# Patient Record
Sex: FEMALE | Race: WHITE | NOT HISPANIC OR LATINO | Employment: FULL TIME | URBAN - METROPOLITAN AREA
[De-identification: names, ages, dates, MRNs, and addresses within clinical notes are randomized per-mention and may not be internally consistent; named-entity substitution may affect disease eponyms.]

---

## 2023-02-24 ENCOUNTER — TELEPHONE (OUTPATIENT)
Dept: INTERNAL MEDICINE | Facility: CLINIC | Age: 33
End: 2023-02-24

## 2023-02-24 ENCOUNTER — OFFICE VISIT (OUTPATIENT)
Dept: INTERNAL MEDICINE | Facility: CLINIC | Age: 33
End: 2023-02-24
Payer: OTHER GOVERNMENT

## 2023-02-24 VITALS
WEIGHT: 156.1 LBS | OXYGEN SATURATION: 98 % | SYSTOLIC BLOOD PRESSURE: 110 MMHG | TEMPERATURE: 97.6 F | DIASTOLIC BLOOD PRESSURE: 70 MMHG | HEART RATE: 72 BPM | HEIGHT: 67 IN | BODY MASS INDEX: 24.5 KG/M2

## 2023-02-24 DIAGNOSIS — F41.0 GENERALIZED ANXIETY DISORDER WITH PANIC ATTACKS: ICD-10-CM

## 2023-02-24 DIAGNOSIS — G89.29 CHRONIC BILATERAL LOW BACK PAIN, UNSPECIFIED WHETHER SCIATICA PRESENT: ICD-10-CM

## 2023-02-24 DIAGNOSIS — Z00.00 HEALTHCARE MAINTENANCE: Primary | ICD-10-CM

## 2023-02-24 DIAGNOSIS — F41.1 GENERALIZED ANXIETY DISORDER WITH PANIC ATTACKS: ICD-10-CM

## 2023-02-24 DIAGNOSIS — J45.20 MILD INTERMITTENT ASTHMA WITHOUT COMPLICATION: ICD-10-CM

## 2023-02-24 DIAGNOSIS — Z01.419 ENCOUNTER FOR WELL WOMAN EXAM WITH ROUTINE GYNECOLOGICAL EXAM: ICD-10-CM

## 2023-02-24 DIAGNOSIS — B35.4 TINEA CORPORIS: ICD-10-CM

## 2023-02-24 DIAGNOSIS — F32.A DEPRESSION, UNSPECIFIED DEPRESSION TYPE: ICD-10-CM

## 2023-02-24 DIAGNOSIS — Z91.09 ENVIRONMENTAL ALLERGIES: ICD-10-CM

## 2023-02-24 DIAGNOSIS — F90.0 ATTENTION DEFICIT HYPERACTIVITY DISORDER (ADHD), PREDOMINANTLY INATTENTIVE TYPE: ICD-10-CM

## 2023-02-24 DIAGNOSIS — M54.50 CHRONIC BILATERAL LOW BACK PAIN, UNSPECIFIED WHETHER SCIATICA PRESENT: ICD-10-CM

## 2023-02-24 PROCEDURE — 99214 OFFICE O/P EST MOD 30 MIN: CPT | Performed by: NURSE PRACTITIONER

## 2023-02-24 PROCEDURE — 99385 PREV VISIT NEW AGE 18-39: CPT | Performed by: NURSE PRACTITIONER

## 2023-02-24 RX ORDER — POLYETHYLENE GLYCOL 3350 17 G/17G
17 POWDER, FOR SOLUTION ORAL DAILY
Qty: 850 G | Refills: 1 | Status: SHIPPED | OUTPATIENT
Start: 2023-02-24 | End: 2023-02-24 | Stop reason: SDUPTHER

## 2023-02-24 RX ORDER — BUPROPION HYDROCHLORIDE 150 MG/1
150 TABLET ORAL DAILY
Qty: 30 TABLET | Refills: 5 | Status: SHIPPED | OUTPATIENT
Start: 2023-02-24 | End: 2023-03-22

## 2023-02-24 RX ORDER — LIDOCAINE 50 MG/G
1 PATCH TOPICAL EVERY 24 HOURS
COMMUNITY
End: 2023-02-24 | Stop reason: SDUPTHER

## 2023-02-24 RX ORDER — LORATADINE 10 MG/1
CAPSULE, LIQUID FILLED ORAL
COMMUNITY
End: 2023-02-24 | Stop reason: SDUPTHER

## 2023-02-24 RX ORDER — POLYETHYLENE GLYCOL 3350 17 G/17G
17 POWDER, FOR SOLUTION ORAL DAILY
COMMUNITY
End: 2023-02-24 | Stop reason: SDUPTHER

## 2023-02-24 RX ORDER — METHYLPREDNISOLONE 4 MG/1
TABLET ORAL
COMMUNITY
Start: 2022-11-17 | End: 2023-02-24

## 2023-02-24 RX ORDER — LORATADINE 10 MG/1
10 TABLET ORAL DAILY
Qty: 90 TABLET | Refills: 1 | Status: SHIPPED | OUTPATIENT
Start: 2023-02-24 | End: 2023-03-22

## 2023-02-24 RX ORDER — CLOTRIMAZOLE 1 %
1 CREAM (GRAM) TOPICAL 2 TIMES DAILY
Qty: 40 G | Refills: 0 | Status: SHIPPED | OUTPATIENT
Start: 2023-02-24 | End: 2023-03-22

## 2023-02-24 RX ORDER — ALPRAZOLAM 0.25 MG/1
0.25 TABLET ORAL 2 TIMES DAILY PRN
Qty: 30 TABLET | Refills: 0 | Status: CANCELLED | OUTPATIENT
Start: 2023-02-24

## 2023-02-24 RX ORDER — LEVONORGESTREL / ETHINYL ESTRADIOL 0.15-0.03
1 KIT ORAL DAILY
Qty: 90 TABLET | Refills: 1 | Status: SHIPPED | OUTPATIENT
Start: 2023-02-24

## 2023-02-24 RX ORDER — MONTELUKAST SODIUM 10 MG/1
10 TABLET ORAL NIGHTLY
Qty: 30 TABLET | Refills: 5 | Status: SHIPPED | OUTPATIENT
Start: 2023-02-24

## 2023-02-24 RX ORDER — ALPRAZOLAM 0.25 MG/1
0.25 TABLET ORAL 2 TIMES DAILY PRN
Qty: 30 TABLET | Refills: 0 | Status: SHIPPED | OUTPATIENT
Start: 2023-02-24

## 2023-02-24 RX ORDER — LEVONORGESTREL / ETHINYL ESTRADIOL 0.15-0.03
KIT ORAL
COMMUNITY
Start: 2022-11-23 | End: 2023-02-24 | Stop reason: SDUPTHER

## 2023-02-24 RX ORDER — LIDOCAINE 50 MG/G
1 PATCH TOPICAL EVERY 24 HOURS
Qty: 30 EACH | Refills: 5 | Status: SHIPPED | OUTPATIENT
Start: 2023-02-24

## 2023-02-24 RX ORDER — ALPRAZOLAM 0.5 MG/1
0.25 TABLET ORAL 2 TIMES DAILY PRN
COMMUNITY
End: 2023-02-24 | Stop reason: DRUGHIGH

## 2023-02-24 RX ORDER — LORATADINE 10 MG/1
10 TABLET ORAL DAILY
COMMUNITY
End: 2023-02-24 | Stop reason: SDUPTHER

## 2023-02-24 RX ORDER — POLYETHYLENE GLYCOL 3350 17 G/17G
17 POWDER, FOR SOLUTION ORAL DAILY
Qty: 850 G | Refills: 1 | Status: SHIPPED | OUTPATIENT
Start: 2023-02-24

## 2023-02-24 NOTE — PROGRESS NOTES
Subjective   Giovanna Lao is a 32 y.o. female.     Chief Complaint   Patient presents with   • Contraception   • Establish Care        History of Present Illness   She is here today as a new patient to establish care.  She feels like her overall health is okay.  She tries to exercise regularly and eat a healthy, balanced diet.  She has noticed return of a fungal skin infection on her chest and stomach under the breast.  She has previously had this in the past requiring antifungal treatment.  [place]     Anxiety with panic attacks- she currently uses Xanax 0.25 mg twice daily as needed for panic attacks. She uses this rarely, on average once every 3 months with improvement in anxiety.   Depression- she notes some feelings of anhedonia and decreased motivation over the past year. Symptoms are worse in the morning. She was previously seeing a therapist, but did not feel this was beneficial.   ADHD- dx in . She was previously on Adderrall, but she did not like the way she felt on this.     GERD/gastritis- she had improvement in symptoms when becoming pregnant with her daughter.  She denies any symptoms currently.  She denies any dysphagia odynophagia  IBS-C- she was previously taking Miralax regularly, but symptoms improved after having her daughter.     Chronic Low Back Pain- hx of slimmed discs. She has previously had MRI imaging completed in  in [place]. She has completed PT with mild improvement. She currently uses lidocaine patches and ibuprofen with management of pain.    Asthma/environmental allergies-she uses albuterol rarely.  She is currently taking fluticasone and Claritin daily but is still having breakthrough allergy symptoms.    GYN- . C/S. She is currently on OCP, Jolessa continuous for 90 days.  She is needing a refill today. Menses are heavy, lasting 4 days. She has dysmenorrhea with this. She previously had an IUD placed and did well with this.  LMP 12/17/2022. Last pap completed 02/2021, normal. No hx of abnormals. She is currently sexually active, monogamous with her .    She is  with a daughter Ninoska 16 months.    The following portions of the patient's history were reviewed and updated as appropriate: allergies, current medications, past family history, past medical history, past social history, past surgical history and problem list.    Review of Systems   Constitutional: Negative for appetite change, chills, diaphoresis, fatigue, fever, unexpected weight gain and unexpected weight loss.   HENT: Positive for congestion, postnasal drip and sore throat. Negative for dental problem, ear pain, hearing loss, mouth sores, nosebleeds, rhinorrhea, sinus pressure, swollen glands, tinnitus and trouble swallowing.    Eyes: Negative for blurred vision, pain, discharge, redness, itching and visual disturbance.   Respiratory: Negative for cough, chest tightness, shortness of breath and wheezing.    Cardiovascular: Negative for chest pain, palpitations and leg swelling.   Gastrointestinal: Negative for abdominal distention, abdominal pain, blood in stool, constipation, diarrhea, nausea, vomiting and GERD.   Endocrine: Negative for cold intolerance and heat intolerance.   Genitourinary: Positive for menstrual problem. Negative for breast discharge, breast lump, breast pain, difficulty urinating, dyspareunia, dysuria, flank pain, frequency, hematuria, pelvic pain, pelvic pressure, urgency, urinary incontinence, vaginal bleeding and vaginal discharge.   Musculoskeletal: Positive for arthralgias (with cold weather) and back pain. Negative for gait problem, joint swelling, myalgias and neck pain.   Skin: Positive for rash. Negative for color change and skin lesions.   Allergic/Immunologic: Positive for environmental allergies. Negative for food allergies.   Neurological: Negative for dizziness, tremors, seizures, syncope, speech difficulty, weakness,  light-headedness, numbness, headache, memory problem and confusion.   Hematological: Negative for adenopathy. Does not bruise/bleed easily.   Psychiatric/Behavioral: Positive for depressed mood. Negative for sleep disturbance, suicidal ideas and stress. The patient is nervous/anxious.        Objective   Physical Exam  Constitutional:       Appearance: Normal appearance. She is well-developed.   HENT:      Head: Normocephalic and atraumatic.      Right Ear: Hearing, tympanic membrane, ear canal and external ear normal.      Left Ear: Hearing, tympanic membrane, ear canal and external ear normal.      Nose: Rhinorrhea present. Rhinorrhea is clear.      Right Turbinates: Enlarged.      Left Turbinates: Enlarged.      Right Sinus: No maxillary sinus tenderness or frontal sinus tenderness.      Left Sinus: No maxillary sinus tenderness or frontal sinus tenderness.      Mouth/Throat:      Lips: Pink.      Mouth: Mucous membranes are moist.      Dentition: Normal dentition.      Tongue: No lesions.      Pharynx: Uvula midline. No pharyngeal swelling, oropharyngeal exudate, posterior oropharyngeal erythema or uvula swelling.      Tonsils: No tonsillar exudate.      Comments: Steer pharynx with clear drainage.  Eyes:      General: Lids are normal.      Extraocular Movements: Extraocular movements intact.      Conjunctiva/sclera: Conjunctivae normal.      Pupils: Pupils are equal, round, and reactive to light.   Neck:      Thyroid: No thyroid mass, thyromegaly or thyroid tenderness.      Vascular: No carotid bruit.      Trachea: Trachea normal.   Cardiovascular:      Rate and Rhythm: Normal rate and regular rhythm.      Pulses: Normal pulses.           Radial pulses are 2+ on the right side and 2+ on the left side.        Popliteal pulses are 2+ on the right side and 2+ on the left side.        Dorsalis pedis pulses are 2+ on the right side and 2+ on the left side.        Posterior tibial pulses are 2+ on the right side and  2+ on the left side.      Heart sounds: S1 normal and S2 normal.   Pulmonary:      Effort: Pulmonary effort is normal.      Breath sounds: Normal breath sounds.   Abdominal:      General: Bowel sounds are normal. There is no distension or abdominal bruit.      Palpations: Abdomen is soft. There is no hepatomegaly or splenomegaly.      Tenderness: There is no abdominal tenderness.      Hernia: No hernia is present.   Musculoskeletal:      Cervical back: Normal range of motion and neck supple.      Right lower leg: No edema.      Left lower leg: No edema.   Lymphadenopathy:      Head:      Right side of head: No submental, submandibular, tonsillar or occipital adenopathy.      Left side of head: No submental, submandibular, tonsillar or occipital adenopathy.      Cervical: No cervical adenopathy.      Upper Body:      Right upper body: No supraclavicular adenopathy.      Left upper body: No supraclavicular adenopathy.      Lower Body: No right inguinal adenopathy. No left inguinal adenopathy.   Skin:     General: Skin is warm and dry.      Findings: Rash present.      Nails: There is no clubbing.             Comments: Annular rash with central clearing present on chest under breasts.   Neurological:      General: No focal deficit present.      Mental Status: She is alert and oriented to person, place, and time.      Cranial Nerves: Cranial nerves 2-12 are intact.      Sensory: Sensation is intact.      Motor: Motor function is intact.      Coordination: Coordination is intact.      Gait: Gait is intact.      Deep Tendon Reflexes:      Reflex Scores:       Patellar reflexes are 2+ on the right side and 2+ on the left side.  Psychiatric:         Attention and Perception: Attention normal.         Mood and Affect: Mood and affect normal.         Speech: Speech normal.         Behavior: Behavior normal.         Thought Content: Thought content normal.         Cognition and Memory: Cognition normal.         Vitals:     02/24/23 1428   BP: 110/70   Pulse: 72   Temp: 97.6 °F (36.4 °C)   SpO2: 98%          Assessment & Plan   Diagnoses and all orders for this visit:    1. Healthcare maintenance (Primary)  -     CBC & Differential; Future  -     Comprehensive Metabolic Panel; Future  -     Hepatitis C Antibody; Future  -     Lipid Panel With LDL / HDL Ratio; Future  -     TSH Rfx On Abnormal To Free T4; Future    2. Generalized anxiety disorder with panic attacks  -     ALPRAZolam (XANAX) 0.25 MG tablet; Take 1 tablet by mouth 2 (Two) Times a Day As Needed for Anxiety.  Dispense: 30 tablet; Refill: 0  -     CBC & Differential; Future  -     Comprehensive Metabolic Panel; Future  -     Hepatitis C Antibody; Future  -     Lipid Panel With LDL / HDL Ratio; Future  -     TSH Rfx On Abnormal To Free T4; Future    3. Depression, unspecified depression type  -     buPROPion XL (Wellbutrin XL) 150 MG 24 hr tablet; Take 1 tablet by mouth Daily.  Dispense: 30 tablet; Refill: 5  -     CBC & Differential; Future  -     Comprehensive Metabolic Panel; Future  -     Hepatitis C Antibody; Future  -     Lipid Panel With LDL / HDL Ratio; Future  -     TSH Rfx On Abnormal To Free T4; Future    4. Attention deficit hyperactivity disorder (ADHD), predominantly inattentive type  -     buPROPion XL (Wellbutrin XL) 150 MG 24 hr tablet; Take 1 tablet by mouth Daily.  Dispense: 30 tablet; Refill: 5  -     CBC & Differential; Future  -     Comprehensive Metabolic Panel; Future  -     Hepatitis C Antibody; Future  -     Lipid Panel With LDL / HDL Ratio; Future  -     TSH Rfx On Abnormal To Free T4; Future    5. Environmental allergies  -     Discontinue: fluticasone (VERAMYST) 27.5 MCG/SPRAY nasal spray; 2 sprays into the nostril(s) as directed by provider Daily.  Dispense: 9.1 mL; Refill: 5  -     loratadine (CLARITIN) 10 MG tablet; Take 1 tablet by mouth Daily.  Dispense: 90 tablet; Refill: 1  -     montelukast (Singulair) 10 MG tablet; Take 1 tablet by mouth  Every Night.  Dispense: 30 tablet; Refill: 5  -     fluticasone (VERAMYST) 27.5 MCG/SPRAY nasal spray; 2 sprays into the nostril(s) as directed by provider Daily.  Dispense: 9.1 mL; Refill: 5  -     CBC & Differential; Future  -     Comprehensive Metabolic Panel; Future  -     Hepatitis C Antibody; Future  -     Lipid Panel With LDL / HDL Ratio; Future  -     TSH Rfx On Abnormal To Free T4; Future    6. Mild intermittent asthma without complication  -     Discontinue: fluticasone (VERAMYST) 27.5 MCG/SPRAY nasal spray; 2 sprays into the nostril(s) as directed by provider Daily.  Dispense: 9.1 mL; Refill: 5  -     montelukast (Singulair) 10 MG tablet; Take 1 tablet by mouth Every Night.  Dispense: 30 tablet; Refill: 5  -     fluticasone (VERAMYST) 27.5 MCG/SPRAY nasal spray; 2 sprays into the nostril(s) as directed by provider Daily.  Dispense: 9.1 mL; Refill: 5  -     CBC & Differential; Future  -     Comprehensive Metabolic Panel; Future  -     Hepatitis C Antibody; Future  -     Lipid Panel With LDL / HDL Ratio; Future  -     TSH Rfx On Abnormal To Free T4; Future    7. Chronic bilateral low back pain, unspecified whether sciatica present  -     lidocaine (LIDODERM) 5 %; Place 1 patch on the skin as directed by provider Daily. Remove & Discard patch within 12 hours or as directed by MD  Dispense: 30 each; Refill: 5  -     CBC & Differential; Future  -     Comprehensive Metabolic Panel; Future  -     Hepatitis C Antibody; Future  -     Lipid Panel With LDL / HDL Ratio; Future  -     TSH Rfx On Abnormal To Free T4; Future    8. Tinea corporis  -     clotrimazole (LOTRIMIN) 1 % cream; Apply 1 application topically to the appropriate area as directed 2 (Two) Times a Day.  Dispense: 40 g; Refill: 0    9. Encounter for well woman exam with routine gynecological exam  -     Ambulatory Referral to Gynecology    Other orders  -     Jolessa 0.15-0.03 MG per tablet; Take 1 tablet by mouth Daily.  Dispense: 90 tablet; Refill:  1  -     Discontinue: polyethylene glycol (MiraLax) 17 GM/SCOOP powder; Take 17 g by mouth Daily.  Dispense: 850 g; Refill: 1  -     Discontinue: polyethylene glycol (MiraLax) 17 GM/SCOOP powder; Take 17 g by mouth Daily.  Dispense: 850 g; Refill: 1  -     polyethylene glycol (MiraLax) 17 GM/SCOOP powder; Take 17 g by mouth Daily.  Dispense: 850 g; Refill: 1      1.  Preventative counseling-encouraged healthy, balanced diet and regular exercise.  Ensure adequate dietary intake of calcium and vitamin D.  2.  Generalized anxiety with panic attacks-stable with as needed Xanax 0.25 mg.  Discussed appropriate use and adverse effects of this high risk medication.  GEOVANNA and Damián completed today.  Prescription refilled today.  Follow-up in 4 weeks.  3.  Depression/ADHD-we will try Wellbutrin  mg daily.  Discussed appropriate use and adverse effects.  No personal history of seizures.  Follow-up in 4 weeks for medication check.  4.  Asthma/environmental allergies-not controlled.  We will try addition of montelukast 10 mg at bedtime.  Do not start this new medication at the same time as Wellbutrin.  Discussed appropriate use and adverse effects.  Notify for any mood changes.  Follow-up in 4 weeks for medication check.  5.  Chronic bilateral low back pain- lidocaine patches refilled today.  MRI records requested from previous PCP.  Recommend regular exercise.  Notify for worsening symptoms.  6.  Tinea corporis-start clotrimazole cream twice a day for 7 to 10 days.     GYN-due.  OCP refilled today.  Referral placed to gynecology to establish care.  Eye exam up-to-date  Dentist up-to-date  Encourage sunscreen use outside    Fasting labs in 2 weeks.  Follow-up in 4 weeks for medication check.

## 2023-03-03 ENCOUNTER — OFFICE VISIT (OUTPATIENT)
Dept: INTERNAL MEDICINE | Facility: CLINIC | Age: 33
End: 2023-03-03
Payer: OTHER GOVERNMENT

## 2023-03-03 VITALS
WEIGHT: 156 LBS | HEIGHT: 67 IN | BODY MASS INDEX: 24.48 KG/M2 | DIASTOLIC BLOOD PRESSURE: 62 MMHG | HEART RATE: 85 BPM | OXYGEN SATURATION: 100 % | SYSTOLIC BLOOD PRESSURE: 98 MMHG | TEMPERATURE: 97.9 F

## 2023-03-03 DIAGNOSIS — J20.9 ACUTE BRONCHITIS, UNSPECIFIED ORGANISM: Primary | ICD-10-CM

## 2023-03-03 LAB
EXPIRATION DATE: NORMAL
FLUAV AG UPPER RESP QL IA.RAPID: NOT DETECTED
FLUBV AG UPPER RESP QL IA.RAPID: NOT DETECTED
INTERNAL CONTROL: NORMAL
Lab: NORMAL
SARS-COV-2 AG UPPER RESP QL IA.RAPID: NOT DETECTED

## 2023-03-03 PROCEDURE — 87428 SARSCOV & INF VIR A&B AG IA: CPT | Performed by: NURSE PRACTITIONER

## 2023-03-03 PROCEDURE — 99213 OFFICE O/P EST LOW 20 MIN: CPT | Performed by: NURSE PRACTITIONER

## 2023-03-03 RX ORDER — BENZONATATE 200 MG/1
200 CAPSULE ORAL 3 TIMES DAILY PRN
Qty: 21 CAPSULE | Refills: 0 | Status: SHIPPED | OUTPATIENT
Start: 2023-03-03 | End: 2023-03-22

## 2023-03-03 RX ORDER — METHYLPREDNISOLONE 4 MG/1
TABLET ORAL
Qty: 21 EACH | Refills: 0 | Status: SHIPPED | OUTPATIENT
Start: 2023-03-03 | End: 2023-03-22

## 2023-03-03 RX ORDER — AZITHROMYCIN 250 MG/1
TABLET, FILM COATED ORAL
Qty: 6 TABLET | Refills: 0 | Status: SHIPPED | OUTPATIENT
Start: 2023-03-03 | End: 2023-03-22

## 2023-03-03 NOTE — PROGRESS NOTES
Subjective   Giovanna Lao is a 32 y.o. female.     Chief Complaint   Patient presents with   • URI     Bilateral ear fluid, wheezing, cough (wet and productive w/yellow phlegm) x6 days. Taking Zyrtec D for relief.         History of Present Illness   She is here today with complaints of cough and congestion.  Symptoms started 6 days ago.  She is experiencing productive cough of yellow, thick sputum. Cough is worse at night. She notes head congestion and bilateral aural fullness. She notes mild sinus pressure.   She denies any fever, chills, SOB, wheezing.  Has been using Zyrtec-D without improvement, and albuterol.   Her daughter has recently been sick with similar symptoms.  Symptoms are staying the same.     The following portions of the patient's history were reviewed and updated as appropriate: allergies, current medications, past family history, past medical history, past social history, past surgical history and problem list.    Review of Systems   Constitutional: Positive for fatigue. Negative for chills and fever.   HENT: Positive for congestion, postnasal drip and sinus pressure. Negative for ear pain (bilateral aural fullness), rhinorrhea, sore throat and trouble swallowing.    Respiratory: Positive for cough. Negative for chest tightness, shortness of breath and wheezing.    Cardiovascular: Negative for chest pain, palpitations and leg swelling.   Neurological: Positive for headache.       Objective   Physical Exam  Constitutional:       General: She is awake.      Appearance: She is well-developed. She is ill-appearing.   HENT:      Head: Normocephalic and atraumatic.      Right Ear: Hearing, ear canal and external ear normal.      Left Ear: Hearing, ear canal and external ear normal.      Ears:      Comments: Fluid present bilateral TM.     Nose: Rhinorrhea present. Rhinorrhea is clear.      Right Turbinates: Enlarged.      Left Turbinates: Enlarged.      Right Sinus: No maxillary sinus tenderness or  frontal sinus tenderness.      Left Sinus: No maxillary sinus tenderness or frontal sinus tenderness.      Mouth/Throat:      Lips: Pink.      Mouth: Mucous membranes are moist. No injury or oral lesions.      Dentition: Normal dentition.      Tongue: No lesions. Tongue does not deviate from midline.      Palate: No mass and lesions.      Pharynx: Uvula midline. No pharyngeal swelling, oropharyngeal exudate, posterior oropharyngeal erythema or uvula swelling.      Comments: Posterior pharynx with clear drainage.  Neck:      Thyroid: No thyroid mass, thyromegaly or thyroid tenderness.      Vascular: No carotid bruit.      Trachea: Trachea normal.   Cardiovascular:      Rate and Rhythm: Normal rate and regular rhythm.      Chest Wall: PMI is not displaced.      Pulses:           Radial pulses are 2+ on the right side and 2+ on the left side.        Dorsalis pedis pulses are 2+ on the right side and 2+ on the left side.        Posterior tibial pulses are 2+ on the right side and 2+ on the left side.      Heart sounds: S1 normal and S2 normal.   Pulmonary:      Effort: Pulmonary effort is normal.      Breath sounds: Examination of the right-upper field reveals wheezing. Examination of the left-upper field reveals wheezing. Wheezing present. No decreased breath sounds, rhonchi or rales.   Musculoskeletal:      Right lower leg: No edema.      Left lower leg: No edema.   Lymphadenopathy:      Head:      Right side of head: No submental, submandibular, tonsillar or occipital adenopathy.      Left side of head: No submental, submandibular, tonsillar or occipital adenopathy.      Cervical: No cervical adenopathy.   Skin:     General: Skin is warm and dry.      Capillary Refill: Capillary refill takes less than 2 seconds.      Nails: There is no clubbing.   Neurological:      Mental Status: She is alert and oriented to person, place, and time.   Psychiatric:         Attention and Perception: Attention normal.         Mood and  Affect: Mood and affect normal.         Speech: Speech normal.         Behavior: Behavior normal. Behavior is cooperative.         Thought Content: Thought content normal.         Cognition and Memory: Cognition normal.         Vitals:    03/03/23 0907   BP: 98/62   Pulse: 85   Temp: 97.9 °F (36.6 °C)   SpO2: 100%      Body mass index is 24.43 kg/m².    Assessment & Plan   Diagnoses and all orders for this visit:    1. Acute bronchitis, unspecified organism (Primary)  -     POCT SARS-CoV-2 Antigen TREE + Flu  -     methylPREDNISolone (MEDROL) 4 MG dose pack; Take as directed on package instructions.  Dispense: 21 each; Refill: 0  -     benzonatate (TESSALON) 200 MG capsule; Take 1 capsule by mouth 3 (Three) Times a Day As Needed for Cough.  Dispense: 21 capsule; Refill: 0    Other orders  -     azithromycin (Zithromax Z-Arvind) 250 MG tablet; Take 2 tablets by mouth on day 1, then 1 tablet daily on days 2-5  Dispense: 6 tablet; Refill: 0      1.  Acute bronchitis-rapid COVID and flu test negative today in office.  With her symptoms becoming worse now deeper in her chest send a prescription for Medrol Dosepak along with a Z-Arvind to cover for bacterial pathogens.  Recommend using albuterol 2 puffs every 4-6 hours as needed for any shortness of breath or wheezing.  Prescription sent for Tessalon 200 mg 3 times daily as needed for cough.  Encouraged her to hydrate well with fluids, vitamin C, vitamin D, plain Mucinex.  Recommend restarting Singulair 10 mg nightly.  If cough persists after treatment would consider addition of ICS.

## 2023-03-13 ENCOUNTER — PATIENT MESSAGE (OUTPATIENT)
Dept: INTERNAL MEDICINE | Facility: CLINIC | Age: 33
End: 2023-03-13
Payer: OTHER GOVERNMENT

## 2023-03-14 NOTE — TELEPHONE ENCOUNTER
Pt advised that she does not need to wean the wellbutrin. She was transferred to the front to schedule

## 2023-03-22 ENCOUNTER — TELEMEDICINE (OUTPATIENT)
Dept: INTERNAL MEDICINE | Facility: CLINIC | Age: 33
End: 2023-03-22
Payer: OTHER GOVERNMENT

## 2023-03-22 DIAGNOSIS — F32.A DEPRESSION, UNSPECIFIED DEPRESSION TYPE: Primary | ICD-10-CM

## 2023-03-22 DIAGNOSIS — R74.8 ELEVATED LIVER ENZYMES: Primary | ICD-10-CM

## 2023-03-22 DIAGNOSIS — F90.0 ATTENTION DEFICIT HYPERACTIVITY DISORDER (ADHD), PREDOMINANTLY INATTENTIVE TYPE: ICD-10-CM

## 2023-03-22 DIAGNOSIS — B36.0 TINEA VERSICOLOR: ICD-10-CM

## 2023-03-22 PROCEDURE — 99213 OFFICE O/P EST LOW 20 MIN: CPT | Performed by: NURSE PRACTITIONER

## 2023-03-22 RX ORDER — ESCITALOPRAM OXALATE 5 MG/1
5 TABLET ORAL DAILY
Qty: 30 TABLET | Refills: 5 | Status: SHIPPED | OUTPATIENT
Start: 2023-03-22

## 2023-03-22 RX ORDER — SELENIUM SULFIDE 2.5 MG/100ML
LOTION TOPICAL DAILY
Qty: 1 EACH | Refills: 0 | Status: SHIPPED | OUTPATIENT
Start: 2023-03-22

## 2023-03-22 NOTE — PROGRESS NOTES
Subjective   Giovanna Lao is a 32 y.o. female.     Chief Complaint   Patient presents with   • Anxiety   • Depression     Pt stop taking Wellbutrin due to side effects. She feels more depressed on it and would like to discuss alternatives.        History of Present Illness   You have chosen to receive care through a telehealth visit.  Do you consent to use a video/audio connection for your medical care today? Yes    She is here today for a telehealth visit using [employer] from her home.  Provider is working from her office.  She is following up on depression.  At last office visit started Wellbutrin  mg daily for depression and concurrent ADHD.  She stopped this medication as she felt it made her depression worse, having dark thoughts and mood swings. She had some improvement in mood once stopping the Wellbutrin.  She has previously taken Adderall for her ADHD but did not like the way she felt on this medication.  She has never been on any other medication for anxiety or depression.  She is currently using Xanax 0.25 mg twice daily as needed for panic attacks.  She denies any SI or nicole currently.    She is also still with fungal rash on her chest.  She tried clotrimazole topical without improvement.    The following portions of the patient's history were reviewed and updated as appropriate: allergies, current medications, past family history, past medical history, past social history, past surgical history and problem list.    Review of Systems   Constitutional: Negative for chills, fatigue and fever.   Respiratory: Negative for cough, chest tightness, shortness of breath and wheezing.    Cardiovascular: Negative for chest pain, palpitations and leg swelling.   Skin: Positive for rash.   Psychiatric/Behavioral: Positive for decreased concentration and depressed mood. Negative for sleep disturbance and suicidal ideas. The patient is not nervous/anxious.        Objective   Physical Exam  Constitutional:        General: She is awake.      Appearance: Normal appearance.   Pulmonary:      Effort: Pulmonary effort is normal.   Neurological:      Mental Status: She is alert and oriented to person, place, and time. Mental status is at baseline.   Psychiatric:         Attention and Perception: Attention and perception normal.         Mood and Affect: Mood and affect normal.         Speech: Speech normal.         Behavior: Behavior normal. Behavior is cooperative.         Thought Content: Thought content normal.         Cognition and Memory: Cognition and memory normal.         Judgment: Judgment normal.         There were no vitals filed for this visit.       Assessment & Plan   Diagnoses and all orders for this visit:    1. Depression, unspecified depression type (Primary)  -     escitalopram (Lexapro) 5 MG tablet; Take 1 tablet by mouth Daily.  Dispense: 30 tablet; Refill: 5    2. Attention deficit hyperactivity disorder (ADHD), predominantly inattentive type    3. Tinea versicolor  -     selenium sulfide (SELSUN) 2.5 % lotion; Apply  topically to the appropriate area as directed Daily.  Dispense: 1 each; Refill: 0      1.  Depression-not controlled.  Start Lexapro 5 mg daily.  Discussed appropriate use and adverse effects.  Notify for worsening depression or SI.  We will follow-up in 4 weeks for medication check.  2.  ADHD- she has previously not tolerated stimulant medications and had worsening depression with Wellbutrin.  We will discuss at next office visit possibly starting Strattera for ADHD.  3.  Tinea versicolor- start selenium sulfide 2.5% lotion daily for 7 to 10 days.  Notify if no improvement in symptoms.    [employer] visit lasting 10 minutes.       Answers for HPI/ROS submitted by the patient on 3/22/2023  Please describe your symptoms.: Discontinued mental health medication per office instructions due to worsening of depression.  Have you had these symptoms before?: Yes  How long have you been having these  symptoms?: 1-4 days  What is the primary reason for your visit?: Other

## 2023-04-19 ENCOUNTER — TELEMEDICINE (OUTPATIENT)
Dept: INTERNAL MEDICINE | Facility: CLINIC | Age: 33
End: 2023-04-19
Payer: OTHER GOVERNMENT

## 2023-04-19 DIAGNOSIS — F41.1 GENERALIZED ANXIETY DISORDER WITH PANIC ATTACKS: ICD-10-CM

## 2023-04-19 DIAGNOSIS — F41.0 GENERALIZED ANXIETY DISORDER WITH PANIC ATTACKS: ICD-10-CM

## 2023-04-19 DIAGNOSIS — F32.A DEPRESSION, UNSPECIFIED DEPRESSION TYPE: Primary | ICD-10-CM

## 2023-04-19 DIAGNOSIS — F90.0 ATTENTION DEFICIT HYPERACTIVITY DISORDER (ADHD), PREDOMINANTLY INATTENTIVE TYPE: ICD-10-CM

## 2023-04-19 PROCEDURE — 99213 OFFICE O/P EST LOW 20 MIN: CPT | Performed by: NURSE PRACTITIONER

## 2023-04-19 RX ORDER — ESCITALOPRAM OXALATE 10 MG/1
10 TABLET ORAL DAILY
Qty: 30 TABLET | Refills: 5 | Status: SHIPPED | OUTPATIENT
Start: 2023-04-19

## 2023-04-19 NOTE — PROGRESS NOTES
Subjective   Giovanna Lao is a 32 y.o. female.     Chief Complaint   Patient presents with   • Anxiety   • Depression        History of Present Illness   You have chosen to receive care through a telehealth visit.  Do you consent to use a video/audio connection for your medical care today? Yes    She is here today for follow-up on anxiety and depression.  At last office visit started Lexapro 5 mg daily.  She was previously started on Wellbutrin for anxiety and depression with concurrent ADHD, but stopped this secondary to side effects.  Since starting the lexapro she has not noticed any major change in depression.  She feels like anxiety is well controlled.  She still notes anhedonia. She has been having some nausea and occasional dizziness with the medication.  This is improving some.  She notes that she has been under more stress with work and has been having a difficult time sleeping as her  is currently in [place].  She has been trying to eat a healthy diet and get outside for exercise.  She denies any SI or nicole.  Positive family hx of mother with anxiety, MDD, agoraphobia and brother with bipolar disorder.    The following portions of the patient's history were reviewed and updated as appropriate: allergies, current medications, past family history, past medical history, past social history, past surgical history and problem list.    Review of Systems   Constitutional: Negative for chills, fatigue and fever.   Respiratory: Negative.    Cardiovascular: Negative.    Gastrointestinal: Positive for nausea.   Neurological: Positive for dizziness.   Psychiatric/Behavioral: Positive for decreased concentration and depressed mood. Negative for suicidal ideas. The patient is not nervous/anxious.        Objective   Physical Exam  Constitutional:       General: She is awake.      Appearance: Normal appearance.   Pulmonary:      Effort: Pulmonary effort is normal.   Neurological:      Mental Status: She is alert  and oriented to person, place, and time. Mental status is at baseline.   Psychiatric:         Attention and Perception: Attention and perception normal.         Mood and Affect: Mood and affect normal.         Speech: Speech normal.         Behavior: Behavior normal. Behavior is cooperative.         Thought Content: Thought content normal.         Cognition and Memory: Cognition and memory normal.         Judgment: Judgment normal.         There were no vitals filed for this visit.       Assessment & Plan   Diagnoses and all orders for this visit:    1. Depression, unspecified depression type (Primary)  -     escitalopram (Lexapro) 10 MG tablet; Take 1 tablet by mouth Daily.  Dispense: 30 tablet; Refill: 5    2. Generalized anxiety disorder with panic attacks  -     escitalopram (Lexapro) 10 MG tablet; Take 1 tablet by mouth Daily.  Dispense: 30 tablet; Refill: 5    3. Attention deficit hyperactivity disorder (ADHD), predominantly inattentive type      1.  Depression/KAREN- no major change on Lexapro 5 mg.  Will increase Lexapro to 10 mg daily.  Encouraged her to continue healthy eating along with regular exercise.  Notify for worsening side effects, changes in mood.  Follow-up in 4 weeks for medication check.  If still not having any improvement in depression or energy level could consider sertraline versus Prozac or change to an SNRI medication.  2.  ADHD-discussed starting Strattera.  Giovanna would like to wait on this for now.  We will follow-up in 4 weeks.    Doximity visit lasting 12 minutes.

## 2023-05-17 DIAGNOSIS — F41.1 GENERALIZED ANXIETY DISORDER WITH PANIC ATTACKS: ICD-10-CM

## 2023-05-17 DIAGNOSIS — F32.A DEPRESSION, UNSPECIFIED DEPRESSION TYPE: ICD-10-CM

## 2023-05-17 DIAGNOSIS — F41.0 GENERALIZED ANXIETY DISORDER WITH PANIC ATTACKS: ICD-10-CM

## 2023-05-17 RX ORDER — ESCITALOPRAM OXALATE 10 MG/1
10 TABLET ORAL DAILY
Qty: 90 TABLET | Refills: 0 | Status: SHIPPED | OUTPATIENT
Start: 2023-05-17

## 2023-06-14 ENCOUNTER — TELEMEDICINE (OUTPATIENT)
Dept: INTERNAL MEDICINE | Facility: CLINIC | Age: 33
End: 2023-06-14
Payer: OTHER GOVERNMENT

## 2023-06-14 DIAGNOSIS — F41.0 GENERALIZED ANXIETY DISORDER WITH PANIC ATTACKS: Primary | ICD-10-CM

## 2023-06-14 DIAGNOSIS — Z91.09 ENVIRONMENTAL ALLERGIES: ICD-10-CM

## 2023-06-14 DIAGNOSIS — J45.20 MILD INTERMITTENT ASTHMA WITHOUT COMPLICATION: ICD-10-CM

## 2023-06-14 DIAGNOSIS — F41.1 GENERALIZED ANXIETY DISORDER WITH PANIC ATTACKS: Primary | ICD-10-CM

## 2023-06-14 DIAGNOSIS — F90.0 ATTENTION DEFICIT HYPERACTIVITY DISORDER (ADHD), PREDOMINANTLY INATTENTIVE TYPE: ICD-10-CM

## 2023-06-14 DIAGNOSIS — F32.A DEPRESSION, UNSPECIFIED DEPRESSION TYPE: ICD-10-CM

## 2023-06-14 PROCEDURE — 99213 OFFICE O/P EST LOW 20 MIN: CPT | Performed by: NURSE PRACTITIONER

## 2023-06-14 RX ORDER — LEVONORGESTREL / ETHINYL ESTRADIOL 0.15-0.03
1 KIT ORAL DAILY
Qty: 90 TABLET | Refills: 1 | Status: SHIPPED | OUTPATIENT
Start: 2023-06-14

## 2023-06-14 RX ORDER — ATOMOXETINE 40 MG/1
40 CAPSULE ORAL DAILY
Qty: 30 CAPSULE | Refills: 5 | Status: SHIPPED | OUTPATIENT
Start: 2023-06-14

## 2023-06-14 RX ORDER — ESCITALOPRAM OXALATE 10 MG/1
10 TABLET ORAL DAILY
Qty: 90 TABLET | Refills: 0 | Status: SHIPPED | OUTPATIENT
Start: 2023-06-14

## 2023-06-14 RX ORDER — MONTELUKAST SODIUM 10 MG/1
10 TABLET ORAL NIGHTLY
Qty: 30 TABLET | Refills: 5 | Status: SHIPPED | OUTPATIENT
Start: 2023-06-14

## 2023-06-14 NOTE — PROGRESS NOTES
Subjective   Giovanna Lao is a 32 y.o. female.     Chief Complaint   Patient presents with    Anxiety    Depression        History of Present Illness   You have chosen to receive care through a telehealth visit.  Do you consent to use a video/audio connection for your medical care today? Yes    Is here today for a telehealth visit using Doximity from her home.  Provider is working from her office.  She is following up on anxiety and depression.  At last office visit increased Lexapro 10 mg daily. She is feeling improvement in anxiety and depression overall on the higher dose. She has forgotten her medication a few days, with side effects the next day. Overall she feels like mood is improved and she notes only occasional depression symptoms. She denies any SI.  She notes worsening ADHD symptoms with difficulty concentrating. She notes difficulty completing tasks and staying focused. She has a difficult time listening to conversations. She has previously tried Adderrall with side effects. She is interested in starting a non-stimulant medication.     The following portions of the patient's history were reviewed and updated as appropriate: allergies, current medications, past family history, past medical history, past social history, past surgical history, and problem list.    Review of Systems   Constitutional:  Negative for chills, fatigue and fever.   Respiratory:  Negative for cough, chest tightness, shortness of breath and wheezing.    Cardiovascular:  Negative for chest pain, palpitations and leg swelling.   Psychiatric/Behavioral:  Positive for behavioral problems, decreased concentration and depressed mood (improving). Negative for sleep disturbance and suicidal ideas. The patient is nervous/anxious (improving).      Objective   Physical Exam  Constitutional:       General: She is awake.      Appearance: Normal appearance.   Pulmonary:      Effort: Pulmonary effort is normal.   Neurological:      Mental  Status: She is alert and oriented to person, place, and time.   Psychiatric:         Attention and Perception: Attention and perception normal.         Mood and Affect: Mood and affect normal.         Speech: Speech normal.         Behavior: Behavior normal. Behavior is cooperative.         Thought Content: Thought content normal.         Cognition and Memory: Cognition and memory normal.         Judgment: Judgment normal.       There were no vitals filed for this visit.       Assessment & Plan   Problems Addressed this Visit          Allergies and Adverse Reactions    Environmental allergies    Relevant Medications    montelukast (Singulair) 10 MG tablet       Mental Health    Generalized anxiety disorder with panic attacks - Primary    Relevant Medications    atomoxetine (Strattera) 40 MG capsule    escitalopram (Lexapro) 10 MG tablet    Depression    Relevant Medications    atomoxetine (Strattera) 40 MG capsule    escitalopram (Lexapro) 10 MG tablet    Attention deficit hyperactivity disorder (ADHD), predominantly inattentive type    Relevant Medications    atomoxetine (Strattera) 40 MG capsule    escitalopram (Lexapro) 10 MG tablet       Pulmonary and Pneumonias    Mild intermittent asthma without complication    Relevant Medications    montelukast (Singulair) 10 MG tablet     Diagnoses         Codes Comments    Generalized anxiety disorder with panic attacks    -  Primary ICD-10-CM: F41.1, F41.0  ICD-9-CM: 300.02, 300.01     Depression, unspecified depression type     ICD-10-CM: F32.A  ICD-9-CM: 311     Attention deficit hyperactivity disorder (ADHD), predominantly inattentive type     ICD-10-CM: F90.0  ICD-9-CM: 314.00     Environmental allergies     ICD-10-CM: Z91.09  ICD-9-CM: V15.09     Mild intermittent asthma without complication     ICD-10-CM: J45.20  ICD-9-CM: 493.90           1.  Depression/KAREN with panic attacks-improving with treatment.  Continue Lexapro 10 mg daily.  Encouraged her to continue using  a pill organizer to help with remembering to take medication consistently.  2.  ADHD-not controlled.  We will try Strattera 40 mg daily.  Discussed appropriate use and adverse effects.  Follow-up in 4 weeks for medication check.    Follow-up in 4 weeks for ADHD.    Doximity visit lasting 15 minutes.

## 2023-09-05 ENCOUNTER — OFFICE VISIT (OUTPATIENT)
Dept: BEHAVIORAL HEALTH | Facility: CLINIC | Age: 33
End: 2023-09-05
Payer: OTHER GOVERNMENT

## 2023-09-05 VITALS
RESPIRATION RATE: 18 BRPM | WEIGHT: 160.7 LBS | SYSTOLIC BLOOD PRESSURE: 118 MMHG | DIASTOLIC BLOOD PRESSURE: 78 MMHG | HEART RATE: 75 BPM | HEIGHT: 67 IN | BODY MASS INDEX: 25.22 KG/M2 | OXYGEN SATURATION: 99 %

## 2023-09-05 DIAGNOSIS — F41.1 GENERALIZED ANXIETY DISORDER WITH PANIC ATTACKS: ICD-10-CM

## 2023-09-05 DIAGNOSIS — F90.2 ATTENTION DEFICIT HYPERACTIVITY DISORDER (ADHD), COMBINED TYPE: Primary | ICD-10-CM

## 2023-09-05 DIAGNOSIS — G47.00 INSOMNIA, UNSPECIFIED TYPE: ICD-10-CM

## 2023-09-05 DIAGNOSIS — F32.A DEPRESSION, UNSPECIFIED DEPRESSION TYPE: ICD-10-CM

## 2023-09-05 DIAGNOSIS — F41.0 GENERALIZED ANXIETY DISORDER WITH PANIC ATTACKS: ICD-10-CM

## 2023-09-05 RX ORDER — VILOXAZINE HYDROCHLORIDE 200 MG/1
200 CAPSULE, EXTENDED RELEASE ORAL EVERY MORNING
Qty: 30 CAPSULE | Refills: 0 | Status: CANCELLED | OUTPATIENT
Start: 2023-09-05

## 2023-09-05 RX ORDER — GUANFACINE 1 MG/1
1 TABLET, EXTENDED RELEASE ORAL NIGHTLY
Qty: 30 TABLET | Refills: 0 | Status: SHIPPED | OUTPATIENT
Start: 2023-09-05

## 2023-09-05 NOTE — PATIENT INSTRUCTIONS
Medication changes: Okay to start Intuniv/guanfacine 1 mg extended release take right before bed for ADHD/sleep.  Monitor for S/S of low blood pressure discussed today, report to [facility] immediately, dangle feet over side of bed when waking up first thing in the morning, give yourself a 7 to 8-hour period devoted to sleep, medication may make you slightly groggy the next morning.  Consider CBT/cognitive behavioral therapy or a ADHD , pt has history of multiple ADHD med's being ineffective or led to side effects.  Consider newer non-stimulant viloxazine/Qelbree for ADHD if Intuniv is not effective or not tolerated, this is a newer drug that will most likely require a prior authorization.  Continue counseling with nata Mayberry 9/18/23    GENERAL NEW PATIENT INSTRUCTIONS:  -The best way to get a hold of [facility] is to call the office at [phone] and ask to leave a message with his medical assistant.  Patient's are not able to message their mental health provider directly via [employer], please give my office up to 48 hours to respond to a patient call/question/refill request.  -Please call 911 or go to the nearest ER if you begin to have thoughts of harming yourself or other people.  -Refill requests will be made during normal office hours, Monday-Friday 8:00-5:30.  D[facility] is out of the office on Wednesdays and weekends.  Follow-up appointments must be maintained in order for prescribing to continue.    -Tuesdays and Thursdays are Dillon's in-office days, Mondays and Fridays are his telehealth days.  The decision to be seen virtually or in person is up to the discretion of the provider, not all behavioral health problems are appropriate for telehealth.    NO SHOW POLICY:  We understand unexpected circumstances arise; however, anytime you miss your appointment we are unable to provide you appropriate care.  In addition, each appointment missed could have been used to provide care for others.  We ask that you call  at least 24 hours in advance to cancel or reschedule an appointment. We would like to take this opportunity to remind you of our policy stating patients who miss THREE or more appointments without cancelling or rescheduling 24 hours in advance of the appointment may be subject to cancellation of any further visits with our clinic and recommendation to seek in-person services/visits. Please call [phone] to reschedule your appointment. If there are reasons that make it difficult for you to keep the appointments, please call and let us know how we can help. Please understand that medication prescribing will not continue without seeing your provider.       [facility]   [address] [place] [address]  P: [phone]  F: [phone]

## 2023-09-05 NOTE — PROGRESS NOTES
"  [facility] PROGRESS NOTE  [facility]  [address], [place] [address]    NAME: Giovanna Lao     : 1990   MRN: [id number]   PCP: Chanda Gomez APRN     DATE: 2023    ALLERGY:Wellbutrin [bupropion]     MEDICATIONS:  ALBUTEROL IN  ALPRAZolam  escitalopram  fluticasone  guanFACINE HCl ER tablet sustained-release 24 hour  Jolessa tablet  lidocaine  montelukast     VITALS: /78   Pulse 75   Resp 18   Ht 170.2 cm (67\")   Wt 72.9 kg (160 lb 11.2 oz)   SpO2 99%   BMI 25.17 kg/m²  No LMP recorded.     Subjective     Chief Complaint   Patient presents with    ADHD      HPI:  33 y.o. female patient seen today for initial eval referred by PCP Chanda Gomez APRN.  Patient reports a history of being diagnosed and treated for ADHD with stimulants in her youth, recently saw her PCP that started her on Strattera that was not tolerated.  Postpartum depression/anxiety appear to be reasonably controlled on Lexapro, no side effects reported or in evidence, patient also notes worsened irritability over the past 2 years, likely tied to untreated ADHD and postpartum but not 100% clear will monitor, patient reports she will delay starting medication due to going out of town and then being on vacation in the very near future, agreed to follow-up in 1 month.  Significant sleep disturbance reported, patient reports it takes several hours before falling asleep, has tried meditation and behavior changes, reports nightmares also occur, not tied any past trauma/abuse but occurs fairly often, patient has appointment with therapist at [facility] on 2023.    PRIOR PSYCH MEDS:  Adderall XR 20 mg X several years, side effects  Wellbutrin  mg X 2 weeks, SI  Strattera 40 mg X few weeks, dizziness    ADHD:  The patient endorses symptoms of ADHD including, but not limited to: 6 or more symptoms> 6 months of inattention lack of attn to details or careless mistakes diff " "sustained attention does not seem to listen does not follow instructions or finish school work or chores or duties in the work place difficulty organinzing tasks easily distracted by extraneous stimuli forgetful in daily activities 6 or more symptoms> 6 months of hyperactivity-impulsivity often blurts out answers often interrupts or intrudes on others Some ADHD symptoms present < 7 years of age Impairment present in 2 or > settings which have caused impairment in important areas of daily functioning.      History of cardiovascular disease: denies  Family history of cardiovascular disease: denies  History of Drug/ETOH abuse: pt denies history of or past tx of, pt reports minor/occasional use of ETOH,, \"pt reports minor use of caffeine, and denies use of illegal substance/street drugs/THC     Social Status:  Ethnic Group: Not  Or   Race: White Or   Marital Status:    Employment status: Full Time [employer]       SUBSTANCE/SEXUAL HISTORY:   reports that she has never smoked. She has never used smokeless tobacco. She reports current alcohol use of about 2.0 standard drinks per week. She reports that she does not use drugs.   reports being sexually active and has had partner(s) who are male. She reports using the following method of birth control/protection: Birth control pill.      FAMILY HISTORY:  family history includes ADD / ADHD in her brother; Alcohol abuse in her mother; Anxiety disorder in her mother; Asthma in her mother; Bipolar disorder in her brother; Depression in her brother, father, and mother; Diabetes in her paternal grandfather; Glaucoma in her maternal grandmother; Heart attack in her paternal grandfather; Hypertension in her father; Nephrolithiasis in her mother; No Known Problems in her paternal grandmother; Paranoid behavior in her father; Schizophrenia in her maternal uncle; Stroke in her paternal grandfather; Suicide Attempts in her mother.   "   PAST MEDICAL HISTORY   has a past medical history of Acid reflux, ADHD, Allergic, Anxiety, Asthma, Depression, Gastritis, Headache, Irritable bowel syndrome, Mental problems, Neuromuscular disorder, Panic disorder (2017), Scoliosis, and Visual impairment.    She has no past medical history of Alcohol abuse, Alcoholism, Anorexia nervosa, Autism spectrum disorder, Bipolar disorder, Borderline personality disorder, Bulimia nervosa, Cancer, Chronic pain disorder, Disease of thyroid gland, Head injury, HIV disease, Liver disease, Obsessive-compulsive disorder, Oppositional defiant disorder, Psychiatric illness, Psychosis, PTSD (post-traumatic stress disorder), Schizoaffective disorder, Seizures, Self-injurious behavior, Substance abuse, Suicide attempt, Violence, history of, Withdrawal symptoms, alcohol, or Withdrawal symptoms, drug or narcotic.     Review of Systems   Constitutional: Negative.  Negative for chills and fever.   Respiratory:  Negative for chest tightness and shortness of breath.    Cardiovascular:  Negative for chest pain.   Gastrointestinal:  Negative for nausea and vomiting.   Neurological:  Negative for dizziness and light-headedness.   Psychiatric/Behavioral:  Positive for agitation, decreased concentration, sleep disturbance, depressed mood and stress. Negative for suicidal ideas. The patient is nervous/anxious.      Objective   Physical Exam  Vitals reviewed.   Constitutional:       Appearance: Normal appearance.   HENT:      Head: Normocephalic.   Cardiovascular:      Rate and Rhythm: Normal rate.   Pulmonary:      Effort: Pulmonary effort is normal.   Skin:     General: Skin is dry.   Neurological:      General: No focal deficit present.      Mental Status: She is alert and oriented to person, place, and time.   Psychiatric:         Mood and Affect: Mood normal.         Behavior: Behavior normal.         Thought Content: Thought content normal.     VITALS:  BP Readings from Last 3 Encounters:    09/05/23 118/78   03/03/23 98/62   02/24/23 110/70      Pulse Readings from Last 3 Encounters:   09/05/23 75   03/03/23 85   02/24/23 72      Wt Readings from Last 3 Encounters:   09/05/23 72.9 kg (160 lb 11.2 oz)   03/03/23 70.8 kg (156 lb)   02/24/23 70.8 kg (156 lb 1.6 oz)     PHQ-9 Depression Screening  Little interest or pleasure in doing things? 1-->several days   Feeling down, depressed, or hopeless? 1-->several days   Trouble falling or staying asleep, or sleeping too much? 2-->more than half the days   Feeling tired or having little energy?     Poor appetite or overeating? 0-->not at all   Feeling bad about yourself/you are a failure/have let yourself or your family down? 0-->not at all   Trouble concentrating on things? 3-->nearly every day   Psychomotor agitation/retardation 2-->more than half the days   Thoughts about death/dying/suicide 0-->not at all   PHQ-9 Total Score 11   How difficult have these problems for you? somewhat difficult     GAD7 Documentation:  Feeling nervous, anxious or on edge 1   Not being able to stop or control worrying 1   Worrying too much about different things 0   Trouble relaxing 2   Being so restless that it is hard to sit still 2   Becoming easily annoyed or irritable 1   Feeling Afraid as if something awful might happen 0   KAREN Total Score 7   How difficult have these problems made it for you? Somewhat difficult     MENTAL STATUS EXAM   General Appearance:  Cleanly groomed and dressed  Eye Contact:  Good eye contact  Attitude:  Cooperative  Motor Activity:  Normal gait, posture  Speech:  Normal rate, tone, volume  Mood and affect:  Flat  Thought Process:  Goal-directed  Associations/ Thought Content:  No delusions  Hallucinations:  None  Suicidal Ideations:  Not present  Homicidal Ideation:  Not present  Sensorium:  Alert  Orientation:  Person, place, time and situation  Attention Span/ Concentration:  Easily distracted  Fund of Knowledge:  Appropriate for age and  educational level  Intellectual Functioning:  Above average  Insight:  Fair  Judgement:  Fair  Reliability:  Fair  Impulse Control:  Poor     LABS:  CBC          3/13/2023    11:25   CBC   WBC 6.50    RBC 4.46    Hemoglobin 13.3    Hematocrit 41.1    MCV 92.2    MCH 29.8    MCHC 32.4    RDW 12.2    Platelets 350       CMP          3/13/2023    11:25 6/16/2023    08:51   CMP   Glucose 89  86    BUN 11  12    Creatinine 0.71  0.74    Sodium 138  141    Potassium 4.2  4.2    Chloride 104  106    Calcium 9.5  9.3    Total Protein 7.5  7.0    Albumin 4.4  4.2    Globulin 3.1  2.8    Total Bilirubin 0.4  0.3    Alkaline Phosphatase 39  34    AST (SGOT) 17  16    ALT (SGPT) 36  18    BUN/Creatinine Ratio 15.5  16.2      TSH          3/13/2023    11:25   TSH   TSH 2.000          Assessment    ASSESSMENT/TREATMENT PLAN/INSTRUCTIONS/EDUCATION    (F90.2) Attention deficit hyperactivity disorder (ADHD), combined type - Plan: guanFACINE HCl ER (INTUNIV) 1 MG tablet sustained-release 24 hour    (G47.00) Insomnia, unspecified type - Plan: guanFACINE HCl ER (INTUNIV) 1 MG tablet sustained-release 24 hour    (F41.1,  F41.0) Generalized anxiety disorder with panic attacks - Plan: guanFACINE HCl ER (INTUNIV) 1 MG tablet sustained-release 24 hour    (F32.A) Depression, unspecified depression type     -The above listed conditions are newly identified to this provider    AFTER VISIT SUMMARY INSTRUCTIONS:    Medication changes: Okay to start Intuniv/guanfacine 1 mg extended release take right before bed for ADHD/sleep.  Monitor for S/S of low blood pressure discussed today, report to [facility] immediately, dangle feet over side of bed when waking up first thing in the morning, give yourself a 7 to 8-hour period devoted to sleep, medication may make you slightly groggy the next morning.  Consider CBT/cognitive behavioral therapy or a ADHD , pt has history of multiple ADHD med's being ineffective or led to side effects.  Consider newer  non-stimulant viloxazine/Qelbree for ADHD if Intuniv is not effective or not tolerated, this is a newer drug that will most likely require a prior authorization.  Continue counseling with ROMAN montelongo, has appt 9/18/23    Return in 1 month (on 10/5/2023) for IN PERSON APPT, Medication Check.    MEDICATION ISSUES:  -JORGE scanned into EMR: 9/23    Last Urine Toxicity           No data to display                 Medications Discontinued During This Encounter   Medication Reason    polyethylene glycol (MiraLax) 17 GM/SCOOP powder *Therapy completed     New Medications Ordered This Visit   Medications    guanFACINE HCl ER (INTUNIV) 1 MG tablet sustained-release 24 hour     Sig: Take 1 mg by mouth Every Night.     Dispense:  30 tablet     Refill:  0      No orders of the defined types were placed in this encounter.       -Barriers: side effects of medication, multiple psychiatric comorbidities , and stress  -Strengths:  motivated     -Progress toward goal: Not at goal  -Functional Status: Moderate impairment   -Prognosis: Fair with Ongoing Treatment        SUMMARY/DISCUSSION:  Pt past social/medical/family history reviewed/updated. ROS conducted, medications/allergies, reviewed, patient was screened today for depression/anxiety, PHQ/KAREN scores reviewed.  Most recent vitals/labs reviewed. Pt was given appropriate time to ask questions and concerns were addressed. A thorough discussion was had that included review of disease process, need for continued monitoring and additional treatment options including use of pharmacological and non-pharmacological approaches to care, decisions were made and agreed upon by patient and provider. Discussed the risks, benefits, and potential side effects of the medications; patient ackowledged and verbally consented.     Part of this note may be an electronic transcription/translation of spoken language to printed text using the Dragon Dictation System. Some of the data in this  electronic note has been brought forward from a previous encounter, any necessary changes have been made, it has been reviewed by this APRN, and it is accurate.    This document has been electronically signed by JEAN Barrios September 5, 2023 16:20 EDT

## 2023-09-11 ENCOUNTER — TELEPHONE (OUTPATIENT)
Dept: INTERNAL MEDICINE | Facility: CLINIC | Age: 33
End: 2023-09-11
Payer: OTHER GOVERNMENT

## 2023-09-11 NOTE — TELEPHONE ENCOUNTER
Caller: Giovanna Lao    Relationship to patient: Self    Best call back number: [phone]    PATIENT IS TRAVELING AT THE END OF THIS MONTH OUT OF [place]TRY AND REQUESTING TO KNOW IF SHE SHOULD GET A COVID-19 BOOSTER PRIOR TO LEAVING. PATIENT STATES THAT SHE LAST RECEIVED A COVID-19 VACCINE IN JULY 2021. PLEASE ADVISE.

## 2023-09-18 ENCOUNTER — TELEPHONE (OUTPATIENT)
Dept: INTERNAL MEDICINE | Facility: CLINIC | Age: 33
End: 2023-09-18
Payer: OTHER GOVERNMENT

## 2023-09-18 ENCOUNTER — TELEPHONE (OUTPATIENT)
Dept: FAMILY MEDICINE CLINIC | Facility: CLINIC | Age: 33
End: 2023-09-18
Payer: OTHER GOVERNMENT

## 2023-09-18 DIAGNOSIS — F41.0 GENERALIZED ANXIETY DISORDER WITH PANIC ATTACKS: ICD-10-CM

## 2023-09-18 DIAGNOSIS — F41.1 GENERALIZED ANXIETY DISORDER WITH PANIC ATTACKS: ICD-10-CM

## 2023-09-18 DIAGNOSIS — F32.A DEPRESSION, UNSPECIFIED DEPRESSION TYPE: ICD-10-CM

## 2023-09-18 RX ORDER — ESCITALOPRAM OXALATE 20 MG/1
20 TABLET ORAL DAILY
Qty: 90 TABLET | Refills: 2 | Status: SHIPPED | OUTPATIENT
Start: 2023-09-18

## 2023-09-18 NOTE — TELEPHONE ENCOUNTER
Pt called and states she is having problems with her new med Intuniv. She says it is causing headaches and she doesn't really think it is working. Please all her back at [phone]

## 2023-09-18 NOTE — TELEPHONE ENCOUNTER
----- Message from JEAN Valenzuela sent at 9/18/2023  1:52 PM EDT -----  Can you call patient to have her schedule a 6-week follow-up for anxiety and depression?

## 2023-09-19 NOTE — TELEPHONE ENCOUNTER
Patient was seen for the first time roughly 2 weeks ago and Intuniv was started at bedtime for ADHD, please let patient know that she has only been on it a short amount of time and that I do not expect significant improvement yet in S/S of ADHD, if headaches are significant enough she can stop medication or try take OTC ibuprofen/tylenol and we can discuss at her follow-up appointment.

## 2023-09-19 NOTE — TELEPHONE ENCOUNTER
"Patient stated that headaches began roughly a half hour after taking the medication at night. Eventually these daily headaches progressed into a migraine. Advised patient to d/c medication per Satinder's advice. Offered patient sooner appointment with Satinder to reassess, patient declined due to an upcoming vacation. Patient also states medication began making her feel fuzzy and \"off.\" Patient will discuss medication change at next appointment with provider.  "

## 2023-10-03 DIAGNOSIS — F90.2 ATTENTION DEFICIT HYPERACTIVITY DISORDER (ADHD), COMBINED TYPE: ICD-10-CM

## 2023-10-03 DIAGNOSIS — F41.1 GENERALIZED ANXIETY DISORDER WITH PANIC ATTACKS: ICD-10-CM

## 2023-10-03 DIAGNOSIS — F41.0 GENERALIZED ANXIETY DISORDER WITH PANIC ATTACKS: ICD-10-CM

## 2023-10-03 DIAGNOSIS — G47.00 INSOMNIA, UNSPECIFIED TYPE: ICD-10-CM

## 2023-10-03 RX ORDER — GUANFACINE 1 MG/1
TABLET, EXTENDED RELEASE ORAL
Qty: 30 TABLET | Refills: 0 | Status: SHIPPED | OUTPATIENT
Start: 2023-10-03

## 2023-10-09 ENCOUNTER — TELEMEDICINE (OUTPATIENT)
Dept: BEHAVIORAL HEALTH | Facility: CLINIC | Age: 33
End: 2023-10-09
Payer: OTHER GOVERNMENT

## 2023-10-09 DIAGNOSIS — F90.2 ATTENTION DEFICIT HYPERACTIVITY DISORDER (ADHD), COMBINED TYPE: Primary | ICD-10-CM

## 2023-10-09 DIAGNOSIS — F32.A DEPRESSION, UNSPECIFIED DEPRESSION TYPE: ICD-10-CM

## 2023-10-09 DIAGNOSIS — G47.00 INSOMNIA, UNSPECIFIED TYPE: ICD-10-CM

## 2023-10-09 PROCEDURE — 99214 OFFICE O/P EST MOD 30 MIN: CPT

## 2023-10-09 NOTE — PROGRESS NOTES
Patient assessed today via MyChart through [facility] pt is at home in a secure environment and verbalizes privacy during interview. MELISSA Carranza is at home in a secure environment using a secure laptop.The patient's condition being diagnosed/treated is appropriate for telemedicine.The provider identified himself as well as his credentials.The patient, and/or patient's guardian, consent to be seen remotely, and when consent is given they understand that the consent allows for patient identifiable information to be sent to a third party as needed. They may refuse to be seen remotely at any time. The electronic data is encrypted and password protected, and the patient and/or guardian has been advised of the potential risks to privacy not withstanding such measures.    Subjective    PATIENT ID: Giovanna Lao, :1990, MRN: [id number]    Chief Complaint   Patient presents with    ADHD      HPI:   33 y.o. female pt presents to [facility] 10/09/2023.  Patient seen for the first time a little over 1 month ago, Intuniv was ordered for ADHD after patient reports history of prior meds being not effective or led to side effects, patient reports Intuniv was not helpful and caused headaches, outside of that patient recently vacationed in [place] X 2 weeks went to [facility] with a 2-year road, reports getting food poisoning on the way back but overall is doing well, has been on higher dose Lexapro 20 mg X 2 weeks only mild improvement reported.     SUBSTANCE/SEXUAL HISTORY:  Social History     Socioeconomic History    Marital status:    Tobacco Use    Smoking status: Never    Smokeless tobacco: Never   Vaping Use    Vaping Use: Never used   Substance and Sexual Activity    Alcohol use: Yes     Alcohol/week: 2.0 standard drinks of alcohol     Types: 2 Cans of beer per week     Comment: Usually just 1, never more than 2.    Drug use: Never    Sexual activity: Yes     Partners: Male      Birth control/protection: Birth control pill     Comment: 91 day pack      FAMILY HISTORY:  family history includes ADD / ADHD in her brother; Alcohol abuse in her mother; Anxiety disorder in her mother; Asthma in her mother; Bipolar disorder in her brother; Depression in her brother, father, and mother; Diabetes in her paternal grandfather; Glaucoma in her maternal grandmother; Heart attack in her paternal grandfather; Hypertension in her father; Nephrolithiasis in her mother; No Known Problems in her paternal grandmother; Paranoid behavior in her father; Schizophrenia in her maternal uncle; Stroke in her paternal grandfather; Suicide Attempts in her mother.     PAST MEDICAL HISTORY   has a past medical history of Acid reflux, ADHD, Allergic, Anxiety, Asthma, Attention deficit hyperactivity disorder (ADHD), combined type (10/9/2023), Depression, Gastritis, Headache, Irritable bowel syndrome, Mental problems, Neuromuscular disorder, Panic disorder (2017), Scoliosis, and Visual impairment.    She has no past medical history of Alcohol abuse, Alcoholism, Anorexia nervosa, Autism spectrum disorder, Bipolar disorder, Borderline personality disorder, Bulimia nervosa, Cancer, Chronic pain disorder, Disease of thyroid gland, Head injury, HIV disease, Liver disease, Obsessive-compulsive disorder, Oppositional defiant disorder, Psychosis, PTSD (post-traumatic stress disorder), Schizoaffective disorder, Seizures, Self-injurious behavior, Substance abuse, Suicide attempt, Violence, history of, Withdrawal symptoms, alcohol, or Withdrawal symptoms, drug or narcotic.     Review of Systems   Constitutional: Negative.  Negative for chills and fever.   Respiratory:  Negative for chest tightness and shortness of breath.    Cardiovascular:  Negative for chest pain.   Gastrointestinal:  Positive for nausea. Negative for vomiting.   Neurological:  Negative for dizziness and light-headedness.   Psychiatric/Behavioral:  Positive for  decreased concentration, sleep disturbance and depressed mood. Negative for suicidal ideas.        Objective   Physical Exam  Constitutional:       Appearance: Normal appearance.   HENT:      Head: Normocephalic.   Pulmonary:      Effort: Pulmonary effort is normal.   Skin:     General: Skin is dry.   Neurological:      General: No focal deficit present.      Mental Status: She is alert and oriented to person, place, and time.   Psychiatric:         Mood and Affect: Mood normal.         Behavior: Behavior normal.         Thought Content: Thought content normal.       VITALS:  Recent Vitals         2/24/2023 3/3/2023 9/5/2023       BP: 110/70 98/62 118/78     Pulse: 72 85 75     Temp: 97.6 øF (36.4 øC) 97.9 øF (36.6 øC) --     Weight: 70.8 kg (156 lb 1.6 oz) 70.8 kg (156 lb) 72.9 kg (160 lb 11.2 oz)     BMI (Calculated): 24.4 24.4 25.2            PHQ-9 Depression Screening  Little interest or pleasure in doing things? (P) 1-->several days   Feeling down, depressed, or hopeless? (P) 0-->not at all   Trouble falling or staying asleep, or sleeping too much? (P) 1-->several days   Feeling tired or having little energy?     Poor appetite or overeating? (P) 1-->several days   Feeling bad about yourself/you are a failure/have let yourself or your family down? (P) 0-->not at all   Trouble concentrating on things? (P) 3-->nearly every day   Psychomotor agitation/retardation (P) 3-->nearly every day   Thoughts about death/dying/suicide (P) 0-->not at all   PHQ-9 Total Score (P) 10   How difficult have these problems for you? (P) somewhat difficult     GAD7 Documentation:  Feeling nervous, anxious or on edge (P) 1   Not being able to stop or control worrying (P) 1   Worrying too much about different things (P) 1   Trouble relaxing (P) 2   Being so restless that it is hard to sit still (P) 1   Becoming easily annoyed or irritable (P) 2   Feeling Afraid as if something awful might happen (P) 0   KAREN Total Score (P) 8   How  difficult have these problems made it for you? (P) Somewhat difficult     MENTAL STATUS EXAM   General Appearance:  Cleanly groomed and dressed  Eye Contact:  Good eye contact  Attitude:  Cooperative  Speech:  Normal rate, tone, volume  Mood and affect:  Normal, pleasant  Thought Process:  Goal-directed  Associations/ Thought Content:  No delusions  Hallucinations:  None  Suicidal Ideations:  Not present  Homicidal Ideation:  Not present  Sensorium:  Alert  Orientation:  Person, place, time and situation  Fund of Knowledge:  Appropriate for age and educational level  Intellectual Functioning:  Average range  Insight:  Fair  Judgement:  Fair  Reliability:  Fair  Impulse Control:  Fair       LABS:  No visits with results within 1 Month(s) from this visit.   Latest known visit with results is:   Results Encounter on 05/17/2023   Component Date Value Ref Range Status    Glucose 06/16/2023 86  65 - 99 mg/dL Final    BUN 06/16/2023 12  6 - 20 mg/dL Final    Creatinine 06/16/2023 0.74  0.57 - 1.00 mg/dL Final    EGFR Result 06/16/2023 111.1  >60.0 mL/min/1.73 Final    Comment: GFR Normal >60  Chronic Kidney Disease <60  Kidney Failure <15      BUN/Creatinine Ratio 06/16/2023 16.2  7.0 - 25.0 Final    Sodium 06/16/2023 141  136 - 145 mmol/L Final    Potassium 06/16/2023 4.2  3.5 - 5.2 mmol/L Final    Chloride 06/16/2023 106  98 - 107 mmol/L Final    Total CO2 06/16/2023 25.7  22.0 - 29.0 mmol/L Final    Calcium 06/16/2023 9.3  8.6 - 10.5 mg/dL Final    Total Protein 06/16/2023 7.0  6.0 - 8.5 g/dL Final    Albumin 06/16/2023 4.2  3.5 - 5.2 g/dL Final    Globulin 06/16/2023 2.8  gm/dL Final    A/G Ratio 06/16/2023 1.5  g/dL Final    Total Bilirubin 06/16/2023 0.3  0.0 - 1.2 mg/dL Final    Alkaline Phosphatase 06/16/2023 34 (L)  39 - 117 U/L Final    AST (SGOT) 06/16/2023 16  1 - 32 U/L Final    ALT (SGPT) 06/16/2023 18  1 - 33 U/L Final      Allergies   Allergen Reactions    Wellbutrin [Bupropion] Other (See Comments)      Worsening depression.     Current Outpatient Medications   Medication Instructions    ALBUTEROL IN 1 puff, Inhalation, Every 4 Hours PRN    ALPRAZolam (XANAX) 0.25 mg, Oral, 2 Times Daily PRN    escitalopram (LEXAPRO) 20 mg, Oral, Daily    fluticasone (VERAMYST) 27.5 MCG/SPRAY nasal spray 2 sprays, Nasal, Daily    guanFACINE HCl ER (INTUNIV) 1 MG tablet sustained-release 24 hour TAKE 1 TABLET BY MOUTH EVERY NIGHT    Jolessa 0.15-0.03 MG per tablet 1 tablet, Oral, Daily    lidocaine (LIDODERM) 5 % 1 patch, Transdermal, Every 24 Hours, Remove & Discard patch within 12 hours or as directed by MD    montelukast (SINGULAIR) 10 mg, Oral, Nightly    Viloxazine HCl  mg, Oral, Daily With Breakfast, THEN INCREASE  MG/DAY    [START ON 10/17/2023] Viloxazine HCl  mg, Oral, Daily With Breakfast     Assessment    ASSESSMENT/TREATMENT PLAN/INSTRUCTIONS/EDUCATION    (F90.2) Attention deficit hyperactivity disorder (ADHD), combined type - Plan: Viloxazine HCl  MG capsule sustained-release 24 hr, Viloxazine HCl  MG capsule sustained-release 24 hr    (F32.A) Depression, unspecified depression type    (G47.00) Insomnia, unspecified type     -ADHD: Unchanged with treatment.  Intuniv that was ordered last was ineffective and led to side effects including headaches, patient has trialed Strattera previously, Wellbutrin tried previously that led to SI, patient has history of being on Adderall immediate and extended release that resulted in mood swings.  Different treatment options discussed including long acting stimulant Vyvanse and nonstimulant Qelbree/viloxazine.  Patient chose to pursue a nonstimulant, patient educated that this is a newer medication and that a PA is likely.  -Depression: Only partially treated, has only been on higher dose of Lexapro 20 mg X 2 weeks.    AFTER VISIT SUMMARY INSTRUCTIONS:    Medication changes:   Qelbree/viloxazine, 100 mg a day with breakfast X7 days then increase to 200 mg  and continue.  Must be on medication for a total of 4 to 6 weeks to get full benefit, a PA is likely which may result in a delay in medication getting filled.  If patient begins to have worsening thoughts of death/dying/suicide, stop medication and call provider immediately, if patient believes she may act on those thoughts call 911 or go to nearest emergency room.  Lexapro 20 mg, continue as previously ordered, need to be on for a few more weeks at higher dose to receive full benefit, patient believes lingering symptoms of depression have not improved as of yet.    FOLLOW UP: Return in about 5 weeks (around 11/13/2023) for Video visit, ADHD, Med Changes.    There are no discontinued medications.  New Medications Ordered This Visit   Medications    Viloxazine HCl  MG capsule sustained-release 24 hr     Sig: Take 1 capsule by mouth Daily With Breakfast for 7 days. THEN INCREASE  MG/DAY     Dispense:  7 capsule     Refill:  0    Viloxazine HCl  MG capsule sustained-release 24 hr     Sig: Take 1 capsule by mouth Daily With Breakfast.     Dispense:  30 capsule     Refill:  0      No orders of the defined types were placed in this encounter.       -Barriers: history of multiple failed medications due to lack of efficacy and/or side effects , national stimulant shortage  -Strengths:  motivated     -Progress toward goal: Not at goal  -Functional Status: Moderate impairment   -Prognosis: Fair with Ongoing Treatment        SUMMARY/DISCUSSION:  Pt past social/medical/family history reviewed/updated. ROS conducted, medications/allergies, reviewed.  Most recent vitals/labs reviewed. Pt was given appropriate time to ask questions and concerns were addressed. A thorough discussion was had that included review of disease process, need for continued monitoring and additional treatment options including use of pharmacological and non-pharmacological approaches to care, decisions were made and agreed upon by patient  and provider. Discussed the risks, benefits, and potential side effects of the medications; patient ackowledged and verbally consented.     Part of this note may be an electronic transcription/translation of spoken language to printed text using the Dragon Dictation System. Some of the data in this electronic note has been brought forward from a previous encounter, any necessary changes have been made, it has been reviewed by this APRN, and it is accurate.    This document has been electronically signed by JEAN Barrios October 9, 2023 14:02 EDT

## 2023-10-09 NOTE — PATIENT INSTRUCTIONS
Medication changes:   Qelbree/viloxazine, 100 mg a day with breakfast X7 days then increase to 200 mg and continue.  Must be on medication for a total of 4 to 6 weeks to get full benefit, a PA is likely which may result in a delay in medication getting filled.  If patient begins to have worsening thoughts of death/dying/suicide, stop medication and call provider immediately, if patient believes she may act on those thoughts call 911 or go to nearest emergency room.  Lexapro 20 mg, continue as previously ordered, need to be on for a few more weeks at higher dose to receive full benefit, patient believes lingering symptoms of depression have not improved as of yet.    GENERAL NEW PATIENT INSTRUCTIONS:  -The best way to get a hold of Dillon is to call the office at [phone] and ask to leave a message with his medical assistant.  Patient's are not able to message their mental health provider directly via [facility], please give my office up to 48 hours to respond to a patient call/question/refill request.  -Please call 911 or go to the nearest ER if you begin to have thoughts of harming yourself or other people.  -Refill requests will be made during normal office hours, Monday-Friday 8:00-5:30.  Dillon is out of the office on Wednesdays and weekends.  Follow-up appointments must be maintained in order for prescribing to continue.    -Tuesdays and Thursdays are Dillon's in-office days, Mondays and Fridays are his telehealth days.  The decision to be seen virtually or in person is up to the discretion of the provider, not all behavioral health problems are appropriate for telehealth.    NO SHOW POLICY:  We understand unexpected circumstances arise; however, anytime you miss your appointment we are unable to provide you appropriate care.  In addition, each appointment missed could have been used to provide care for others.  We ask that you call at least 24 hours in advance to cancel or reschedule an appointment. We would like  to take this opportunity to remind you of our policy stating patients who miss THREE or more appointments without cancelling or rescheduling 24 hours in advance of the appointment may be subject to cancellation of any further visits with our clinic and recommendation to seek in-person services/visits. Please call [phone] to reschedule your appointment. If there are reasons that make it difficult for you to keep the appointments, please call and let us know how we can help. Please understand that medication prescribing will not continue without seeing your provider.       [facility]   [address] [place] [address]  P: [phone]  F: [phone]

## 2023-10-12 ENCOUNTER — PRIOR AUTHORIZATION (OUTPATIENT)
Dept: BEHAVIORAL HEALTH | Facility: CLINIC | Age: 33
End: 2023-10-12
Payer: OTHER GOVERNMENT

## 2023-10-13 DIAGNOSIS — F90.2 ATTENTION DEFICIT HYPERACTIVITY DISORDER (ADHD), COMBINED TYPE: Primary | ICD-10-CM

## 2023-10-13 DIAGNOSIS — Z79.899 HIGH RISK MEDICATION USE: ICD-10-CM

## 2023-10-13 RX ORDER — LISDEXAMFETAMINE DIMESYLATE CAPSULES 30 MG/1
30 CAPSULE ORAL EVERY MORNING
Qty: 30 CAPSULE | Refills: 0 | Status: SHIPPED | OUTPATIENT
Start: 2023-10-13

## 2023-10-13 NOTE — PROGRESS NOTES
Medication changes:   Discontinue viloxazine due to insurance/cost  Okay to start Vyvanse 30 mg take first thing in the morning.  Follow-up in person to sign controlled substance agreement and do a urine compliance drug analysis and vital signs.  Once stable patient must be seen every 2-3 months while being on a controlled substance.    Stimulant/ADHD General Instructions:  Patient educated that they must call every month when they have 3 days left of medication to request a refill.  Patient educated that prescribing will not continue unless follow-up appointments are maintained.  Prescriptions can only be sent to a [place] pharmacy, 30-day supply no refills.  Monitor for appetite suppression/weight loss, worsening anxiety, worsening sleep.  Remember to stay hydrated and snack throughout the day to avoid afternoon crash.  Any cardiovascular symptoms including chest pain, shortness of breath, dizziness, lightheadedness, stop medication and go to nearest emergency room or call 911.  Patient has been educated on the risk versus benefit of being prescribed a controlled substance, patient has been educated on additional monitoring that is required including Damián reports, random urine drug screens, and pill counts. Patient has been educated on nonstimulant options that carry with a much lower risk, and require less monitoring.

## 2023-10-26 DIAGNOSIS — Z79.899 HIGH RISK MEDICATION USE: ICD-10-CM

## 2023-10-26 DIAGNOSIS — F90.2 ATTENTION DEFICIT HYPERACTIVITY DISORDER (ADHD), COMBINED TYPE: Primary | ICD-10-CM

## 2023-10-26 RX ORDER — METHYLPHENIDATE HYDROCHLORIDE 10 MG/1
10 CAPSULE, EXTENDED RELEASE ORAL EVERY MORNING
Qty: 21 CAPSULE | Refills: 0 | Status: SHIPPED | OUTPATIENT
Start: 2023-10-26 | End: 2023-11-16

## 2023-10-27 ENCOUNTER — OFFICE VISIT (OUTPATIENT)
Dept: OBSTETRICS AND GYNECOLOGY | Facility: CLINIC | Age: 33
End: 2023-10-27
Payer: OTHER GOVERNMENT

## 2023-10-27 VITALS
SYSTOLIC BLOOD PRESSURE: 121 MMHG | HEIGHT: 67 IN | DIASTOLIC BLOOD PRESSURE: 75 MMHG | BODY MASS INDEX: 25.15 KG/M2 | WEIGHT: 160.2 LBS

## 2023-10-27 DIAGNOSIS — K64.4 EXTERNAL HEMORRHOIDS: ICD-10-CM

## 2023-10-27 DIAGNOSIS — Z01.419 WOMEN'S ANNUAL ROUTINE GYNECOLOGICAL EXAMINATION: Primary | ICD-10-CM

## 2023-10-27 DIAGNOSIS — Z30.41 ENCOUNTER FOR SURVEILLANCE OF CONTRACEPTIVE PILLS: ICD-10-CM

## 2023-10-27 RX ORDER — IBUPROFEN 100 MG/1
100 TABLET, CHEWABLE ORAL
COMMUNITY

## 2023-10-27 RX ORDER — DOXYCYCLINE HYCLATE 100 MG/1
1 CAPSULE ORAL EVERY 12 HOURS SCHEDULED
COMMUNITY
Start: 2023-10-25

## 2023-10-27 RX ORDER — METHYLPREDNISOLONE 4 MG/1
TABLET ORAL
COMMUNITY
Start: 2023-10-25

## 2023-10-27 RX ORDER — LEVONORGESTREL / ETHINYL ESTRADIOL 0.15-0.03
1 KIT ORAL DAILY
Qty: 90 TABLET | Refills: 3 | Status: SHIPPED | OUTPATIENT
Start: 2023-10-27

## 2023-10-27 NOTE — PROGRESS NOTES
GYN Annual Exam     Chief Complaint   Patient presents with    Gynecologic Exam     Patient is here today for her annual exam. States last AE was early , last pap .    Establish Care       HPI    Giovanna Lao is a 33 y.o. female who presents for annual well woman exam.  She is sexually active. Periods are every 90 days with JANAY. Hx frequent ovarian cysts without contraception.  No intermenstrual bleeding, spotting, or discharge. Performing SBE:completes. Denies history of migraine with aura, denies history of DVT, there is no family history of DVT. She is a nonsmoker.    This is my first time meeting Giovanna Lao  She is new to our office  OB History          2    Para   1    Term   1            AB   1    Living   1         SAB        IAB   1    Ectopic        Molar        Multiple        Live Births   1                LMP- 24  Current contraception: OCP (estrogen/progesterone)  Last Pap- 2021 NIL   History of abnormal Pap smear: no  History of STD-denies  Family history of uterine, colon or ovarian cancer: no  Family history of breast cancer: no  Gardasil Vaccine: completed     Past Medical History:   Diagnosis Date    Acid reflux     ADHD     Allergic     Anxiety     Asthma     Attention deficit hyperactivity disorder (ADHD), combined type 10/9/2023    Depression     Gastritis     Headache     Irritable bowel syndrome     IBS-C    Mental problems     Neuromuscular disorder     Thoracic Outlet Syndrome    Panic disorder 2017    Infrequent panic attacks    Scoliosis     Mild    Visual impairment        Past Surgical History:   Procedure Laterality Date    ABDOMINAL SURGERY  2021     section    BONE RESECTION, RIB Left      SECTION      D & C FIRST TRIMESTER / TX INCOMPLETE / MISSED / SEPTIC / INDUCED            Current Outpatient Medications:     ALBUTEROL IN, Inhale 1 puff Every 4 (Four) Hours As Needed., Disp: , Rfl:     ALPRAZolam (XANAX) 0.25 MG  tablet, Take 1 tablet by mouth 2 (Two) Times a Day As Needed for Anxiety., Disp: 30 tablet, Rfl: 0    doxycycline (VIBRAMYCIN) 100 MG capsule, Take 1 capsule by mouth Every 12 (Twelve) Hours., Disp: , Rfl:     escitalopram (Lexapro) 20 MG tablet, Take 1 tablet by mouth Daily., Disp: 90 tablet, Rfl: 2    fluticasone (VERAMYST) 27.5 MCG/SPRAY nasal spray, 2 sprays into the nostril(s) as directed by provider Daily., Disp: 9.1 mL, Rfl: 5    ibuprofen (ADVIL,MOTRIN) 100 MG chewable tablet, Chew 1 tablet., Disp: , Rfl:     Jolessa 0.15-0.03 MG per tablet, Take 1 tablet by mouth Daily., Disp: 90 tablet, Rfl: 3    lidocaine (LIDODERM) 5 %, Place 1 patch on the skin as directed by provider Daily. Remove & Discard patch within 12 hours or as directed by MD, Disp: 30 each, Rfl: 5    methylphenidate LA (Ritalin LA) 10 MG 24 hr capsule, Take 1 capsule by mouth Every Morning for 21 days, Disp: 21 capsule, Rfl: 0    methylPREDNISolone (MEDROL) 4 MG dose pack, TAKE 6 TABLETS ON DAY 1 AS DIRECTED ON PACKAGE AND DECREASE BY 1 TAB EACH DAY FOR A TOTAL OF 6 DAYS, Disp: , Rfl:     montelukast (Singulair) 10 MG tablet, Take 1 tablet by mouth Every Night., Disp: 30 tablet, Rfl: 5    Allergies   Allergen Reactions    Wellbutrin [Bupropion] Other (See Comments)     Worsening depression.       Social History     Tobacco Use    Smoking status: Never    Smokeless tobacco: Never   Vaping Use    Vaping Use: Never used   Substance Use Topics    Alcohol use: Yes     Alcohol/week: 2.0 standard drinks of alcohol     Types: 2 Cans of beer per week     Comment: Usually just 1, never more than 2.    Drug use: Never       Family History   Problem Relation Age of Onset    Alcohol abuse Mother     Depression Mother     Asthma Mother     Nephrolithiasis Mother     Anxiety disorder Mother         KAREN and Agoraphobia    Suicide Attempts Mother         At least 2    Depression Father     Hypertension Father     Paranoid behavior Father     Depression Brother   "   Bipolar disorder Brother     ADD / ADHD Brother     Glaucoma Maternal Grandmother     No Known Problems Paternal Grandmother     Heart attack Paternal Grandfather     Stroke Paternal Grandfather     Diabetes Paternal Grandfather     Schizophrenia Maternal Uncle     Colon cancer Neg Hx     Cervical cancer Neg Hx     Uterine cancer Neg Hx     Ovarian cancer Neg Hx     Breast cancer Neg Hx     Thyroid cancer Neg Hx        Review of Systems   Constitutional:  Negative for chills, fatigue and fever.   Gastrointestinal:  Positive for constipation (IBS-C). Negative for abdominal distention, abdominal pain, diarrhea, nausea and vomiting.        +hemorrhoids    Genitourinary:  Negative for breast discharge, breast lump, breast pain, dysuria, frequency, menstrual problem, pelvic pain, pelvic pressure, urgency, vaginal bleeding, vaginal discharge and vaginal pain.   Musculoskeletal:  Negative for gait problem.   Skin:  Negative for rash.   Neurological:  Negative for dizziness and headache.   Psychiatric/Behavioral:  Negative for behavioral problems.        /75   Ht 170.2 cm (67.01\")   Wt 72.7 kg (160 lb 3.2 oz)   LMP 09/26/2023 (Approximate) Comment: 3 month BC pack  BMI 25.08 kg/m²     Physical Exam  Constitutional:       Appearance: Normal appearance.   Genitourinary:      Vulva, bladder and urethral meatus normal.      No lesions in the vagina.      Right Labia: No rash, tenderness, lesions, skin changes or Bartholin's cyst.     Left Labia: No tenderness, lesions, skin changes, Bartholin's cyst or rash.     No labial fusion noted.      No inguinal adenopathy present in the right or left side.     No vaginal discharge, erythema, tenderness, bleeding or ulceration.      No vaginal prolapse present.     No vaginal atrophy present.       Right Adnexa: not tender, not full, not palpable, no mass present and not absent.     Left Adnexa: not tender, not full, not palpable, no mass present and not absent.     No " cervical motion tenderness, discharge, friability, lesion, polyp, nabothian cyst or eversion.      Uterus is not enlarged, fixed, tender, irregular or prolapsed.      No uterine mass detected.     No urethral tenderness or mass present.      Pelvic exam was performed with patient in the lithotomy position.   Rectum:      No external hemorrhoid.   Breasts:     Breasts are symmetrical.      Right: Present. No swelling, bleeding, inverted nipple, mass, nipple discharge, skin change, tenderness or breast implant.      Left: Present. No swelling, bleeding, inverted nipple, mass, nipple discharge, skin change, tenderness or breast implant.   HENT:      Head: Normocephalic and atraumatic.   Eyes:      Pupils: Pupils are equal, round, and reactive to light.   Cardiovascular:      Rate and Rhythm: Normal rate.   Pulmonary:      Effort: Pulmonary effort is normal.   Abdominal:      General: There is no distension.      Palpations: Abdomen is soft. There is no mass.      Tenderness: There is no abdominal tenderness. There is no guarding.      Hernia: No hernia is present. There is no hernia in the left inguinal area or right inguinal area.   Musculoskeletal:         General: Normal range of motion.      Cervical back: Normal range of motion and neck supple. No tenderness.   Lymphadenopathy:      Cervical: No cervical adenopathy.      Upper Body:      Right upper body: No supraclavicular, axillary or pectoral adenopathy.      Left upper body: No supraclavicular, axillary or pectoral adenopathy.      Lower Body: No right inguinal adenopathy. No left inguinal adenopathy.   Neurological:      General: No focal deficit present.      Mental Status: She is alert and oriented to person, place, and time.      Cranial Nerves: No cranial nerve deficit.   Skin:     General: Skin is warm and dry.   Psychiatric:         Mood and Affect: Mood normal.         Behavior: Behavior normal.         Thought Content: Thought content normal.          Judgment: Judgment normal.   Vitals and nursing note reviewed.       Assessment   Diagnoses and all orders for this visit:    1. Women's annual routine gynecological examination (Primary)    2. Encounter for surveillance of contraceptive pills  -     Jolessa 0.15-0.03 MG per tablet; Take 1 tablet by mouth Daily.  Dispense: 90 tablet; Refill: 3    3. External hemorrhoids         Plan   Well woman exam: Pap are  up to date Recommend MVI daily.    Contraception: JANAY refilled  STD: Enc condoms. Desires STD screen today- No.   Smoking status: nonsmoker   Encouraged annual mammogram screening starting at age 40. Instructed on how to perform SBE. Encouraged breast health self awareness.  6.    Encouraged 150 minutes of exercise per week if not medially contraindicated.   7.    BMI 25.08  8.    For hemorrhoids, BID sitz baths, tucks pads PRN, wet wipes for cleansing. Avoid straining. Discussed OTC treatment options such as preparation H and hydrocortisone. Recommend increased hydration with water, routine exercise, high fiber diet, and once or twice daily stool softeners such as colace or miralax to prevent any constipation. Call with any rectal bleeding or worsening symptoms.     Follow Up one year or PRN    Sherin Ramirez, APRN  10/27/2023  14:38 EDT

## 2023-10-31 ENCOUNTER — PATIENT MESSAGE (OUTPATIENT)
Dept: OBSTETRICS AND GYNECOLOGY | Facility: CLINIC | Age: 33
End: 2023-10-31
Payer: OTHER GOVERNMENT

## 2023-10-31 NOTE — PROGRESS NOTES
My chart message has been sent to the patient for [place] [facility] [place]

## 2023-12-15 ENCOUNTER — OFFICE VISIT (OUTPATIENT)
Dept: INTERNAL MEDICINE | Facility: CLINIC | Age: 33
End: 2023-12-15
Payer: OTHER GOVERNMENT

## 2023-12-15 VITALS
HEART RATE: 90 BPM | OXYGEN SATURATION: 97 % | SYSTOLIC BLOOD PRESSURE: 102 MMHG | TEMPERATURE: 98.8 F | DIASTOLIC BLOOD PRESSURE: 62 MMHG | HEIGHT: 67 IN | BODY MASS INDEX: 25.54 KG/M2 | WEIGHT: 162.7 LBS

## 2023-12-15 DIAGNOSIS — J45.20 MILD INTERMITTENT ASTHMA WITHOUT COMPLICATION: ICD-10-CM

## 2023-12-15 DIAGNOSIS — J02.0 STREP PHARYNGITIS: Primary | ICD-10-CM

## 2023-12-15 LAB
EXPIRATION DATE: ABNORMAL
EXPIRATION DATE: NORMAL
FLUAV AG UPPER RESP QL IA.RAPID: NOT DETECTED
FLUBV AG UPPER RESP QL IA.RAPID: NOT DETECTED
INTERNAL CONTROL: ABNORMAL
INTERNAL CONTROL: NORMAL
Lab: ABNORMAL
Lab: NORMAL
S PYO AG THROAT QL: POSITIVE
SARS-COV-2 AG UPPER RESP QL IA.RAPID: NOT DETECTED

## 2023-12-15 PROCEDURE — 87880 STREP A ASSAY W/OPTIC: CPT | Performed by: NURSE PRACTITIONER

## 2023-12-15 PROCEDURE — 99213 OFFICE O/P EST LOW 20 MIN: CPT | Performed by: NURSE PRACTITIONER

## 2023-12-15 PROCEDURE — 87428 SARSCOV & INF VIR A&B AG IA: CPT | Performed by: NURSE PRACTITIONER

## 2023-12-15 RX ORDER — AMOXICILLIN 875 MG/1
875 TABLET, COATED ORAL 2 TIMES DAILY
Qty: 20 TABLET | Refills: 0 | Status: SHIPPED | OUTPATIENT
Start: 2023-12-15 | End: 2023-12-25

## 2023-12-15 RX ORDER — FLUTICASONE PROPIONATE AND SALMETEROL XINAFOATE 115; 21 UG/1; UG/1
2 AEROSOL, METERED RESPIRATORY (INHALATION)
Qty: 1 EACH | Refills: 2 | Status: SHIPPED | OUTPATIENT
Start: 2023-12-15

## 2023-12-15 NOTE — PROGRESS NOTES
Subjective   Giovanna Lao is a 33 y.o. female.     Chief Complaint   Patient presents with    Sore Throat       Pt c/o sore throat, congestion, sinus pressure,both earache and fullness and migraines, dizziness X Monday.    Earache    sinus pressure    Nasal Congestion        History of Present Illness   She is here today with complaints of URI symptoms.  Symptom onset was Monday.  She is experiencing sore throat, sinus pressure, congestion, headache and bilateral ear pain and pressure. She had some dizziness yesterday.   She has been using Tylenol and ibuprofen along with sudafed with mild improvement.  Her  is currently sick with similar symptoms, and her daughter was recently diagnosed with impetigo.  She denies any cough, fever, chills.  She does note that over the past several months she has had multiple upper respiratory tract infections.  She is currently using her albuterol as needed along with Singulair 10 mg nightly, Flonase and Zyrtec-D.  She notes occasional nighttime awakenings with coughing and wheezing.  She notes a history of upper respiratory tract infections typically in the fall and winter.    The following portions of the patient's history were reviewed and updated as appropriate: allergies, current medications, past family history, past medical history, past social history, past surgical history, and problem list.    Review of Systems   Constitutional:  Positive for fatigue. Negative for chills and fever.   HENT:  Positive for congestion, ear pain, postnasal drip, sinus pressure, sore throat and swollen glands.    Respiratory:  Negative for cough, chest tightness, shortness of breath and wheezing.    Cardiovascular:  Negative for chest pain, palpitations and leg swelling.   Neurological:  Positive for dizziness and headache.       Objective   Physical Exam  Constitutional:       General: She is awake.      Appearance: She is well-developed. She is ill-appearing.   HENT:      Head:  Normocephalic and atraumatic.      Right Ear: Hearing, ear canal and external ear normal.      Left Ear: Hearing, ear canal and external ear normal.      Ears:      Comments: Fluid present bilateral TM.     Nose: Congestion present.      Right Turbinates: Enlarged.      Left Turbinates: Enlarged.      Right Sinus: No maxillary sinus tenderness or frontal sinus tenderness.      Left Sinus: No maxillary sinus tenderness or frontal sinus tenderness.      Mouth/Throat:      Lips: Pink.      Mouth: Mucous membranes are moist. No injury or oral lesions.      Dentition: Normal dentition.      Tongue: No lesions. Tongue does not deviate from midline.      Palate: No mass and lesions.      Pharynx: Uvula midline. Posterior oropharyngeal erythema present.   Neck:      Thyroid: No thyroid mass, thyromegaly or thyroid tenderness.      Vascular: No carotid bruit.      Trachea: Trachea normal.   Cardiovascular:      Rate and Rhythm: Normal rate and regular rhythm.      Chest Wall: PMI is not displaced.      Pulses:           Radial pulses are 2+ on the right side and 2+ on the left side.        Dorsalis pedis pulses are 2+ on the right side and 2+ on the left side.        Posterior tibial pulses are 2+ on the right side and 2+ on the left side.      Heart sounds: S1 normal and S2 normal.   Pulmonary:      Effort: Pulmonary effort is normal.      Breath sounds: Normal breath sounds.   Musculoskeletal:      Right lower leg: No edema.      Left lower leg: No edema.   Lymphadenopathy:      Head:      Right side of head: Tonsillar adenopathy present. No submental, submandibular or occipital adenopathy.      Left side of head: Tonsillar adenopathy present. No submental, submandibular or occipital adenopathy.      Cervical: No cervical adenopathy.      Comments: Shotty tonsillar nodes bilaterally, mildly tender to palpation.   Skin:     General: Skin is warm and dry.      Capillary Refill: Capillary refill takes less than 2 seconds.       Nails: There is no clubbing.   Neurological:      Mental Status: She is alert and oriented to person, place, and time.   Psychiatric:         Attention and Perception: Attention normal.         Mood and Affect: Mood and affect normal.         Speech: Speech normal.         Behavior: Behavior normal. Behavior is cooperative.         Thought Content: Thought content normal.         Cognition and Memory: Cognition normal.         Vitals:    12/15/23 0815   BP: 102/62   Pulse: 90   Temp: 98.8 °F (37.1 °C)   SpO2: 97%      Body mass index is 25.47 kg/m².    Assessment & Plan   Problems Addressed this Visit       Mild intermittent asthma without complication    Relevant Medications    fluticasone-salmeterol (Advair HFA) 115-21 MCG/ACT inhaler     Other Visit Diagnoses       Strep pharyngitis    -  Primary    Relevant Medications    amoxicillin (AMOXIL) 875 MG tablet          Diagnoses         Codes Comments    Strep pharyngitis    -  Primary ICD-10-CM: J02.0  ICD-9-CM: 034.0     Mild intermittent asthma without complication     ICD-10-CM: J45.20  ICD-9-CM: 493.90           1.  Strep pharyngitis-rapid strep test positive today in office.  Start amoxicillin 875 mg twice daily for 10 days.  Take antibiotic with food and start a probiotic separate from the antibiotic.  Okay to continue Tylenol and ibuprofen as needed for analgesia.  Recommend warm salt water gargles, vitamin C, vitamin D.  Make sure to throw away toothbrush after 48 hours on antibiotic.  Notify for worsening symptoms.  2.  Asthma-not controlled in the fall and winter seasons.  Will start Advair 2 puffs twice daily and continue as needed albuterol.  Rinse mouth out or brush teeth after use.  Recommend changing Flonase to Nasacort and trying Xyzal instead of Zyrtec.  Recommend using Singulair nightly in the fall and winter seasons.  Notify if having use albuterol greater than 2 days a week not associate with exercise.

## 2023-12-19 DIAGNOSIS — Z91.09 ENVIRONMENTAL ALLERGIES: ICD-10-CM

## 2023-12-19 DIAGNOSIS — J45.20 MILD INTERMITTENT ASTHMA WITHOUT COMPLICATION: ICD-10-CM

## 2023-12-20 RX ORDER — MONTELUKAST SODIUM 10 MG/1
10 TABLET ORAL NIGHTLY
Qty: 30 TABLET | Refills: 5 | OUTPATIENT
Start: 2023-12-20

## 2023-12-20 RX ORDER — MONTELUKAST SODIUM 10 MG/1
TABLET ORAL
Qty: 30 TABLET | Refills: 5 | Status: SHIPPED | OUTPATIENT
Start: 2023-12-20

## 2024-01-03 NOTE — PROGRESS NOTES
Patient assessed today via MyChart through EPIC pt is at home in a secure environment and verbalizes privacy during interview. MELISSA Carranza is at home in a secure environment using a secure laptop.The patient's condition being diagnosed/treated is appropriate for telemedicine.The provider identified himself as well as his credentials.The patient, and/or patient's guardian, consent to be seen remotely, and when consent is given they understand that the consent allows for patient identifiable information to be sent to a third party as needed. They may refuse to be seen remotely at any time. The electronic data is encrypted and password protected, and the patient and/or guardian has been advised of the potential risks to privacy not withstanding such measures.    Subjective    PATIENT ID: Giovanna Lao, :1990, MRN: [id number]    Chief Complaint   Patient presents with    ADHD      HPI:   33 y.o. female pt presents to [facility] 2024.  Patient seen roughly 1 month ago at that time methylphenidate was ordered for ADHD, patient has history of trial multiple meds that led to side effects, patient reports she feels no difference at all, has taken medication on/off for several weeks stopped due to having multiple allergies/upper respiratory type symptoms and was on Sudafed, advised not to take Sudafed and stimulant together, multiple other issues reported today include increased fatigue, poor sleep, has trouble falling asleep, gets 4 to 5 hours at night, has never been on meds for sleep, patient educated on risk of poor sleep with stimulants, last PA for Vyvanse was denied due to needing approval/failure with methylphenidate, will reorder today.  Patient reports fatigue may have started around time she was started on Lexapro, unclear, patient educated we need to focus on 1 issue at a time, patient chose to focus on ADHD.     SUBSTANCE/SEXUAL HISTORY:  Social History      Socioeconomic History    Marital status:    Tobacco Use    Smoking status: Never    Smokeless tobacco: Never   Vaping Use    Vaping Use: Never used   Substance and Sexual Activity    Alcohol use: Yes     Alcohol/week: 2.0 standard drinks of alcohol     Types: 2 Cans of beer per week     Comment: Usually just 1, never more than 2.    Drug use: Never    Sexual activity: Yes     Partners: Male     Birth control/protection: Birth control pill     Comment: 91 day pack      FAMILY HISTORY:  family history includes ADD / ADHD in her brother; Alcohol abuse in her mother; Anxiety disorder in her mother; Asthma in her mother; Bipolar disorder in her brother; Depression in her brother, father, and mother; Diabetes in her paternal grandfather; Glaucoma in her maternal grandmother; Heart attack in her paternal grandfather; Hypertension in her father; Nephrolithiasis in her mother; No Known Problems in her paternal grandmother; Paranoid behavior in her father; Schizophrenia in her maternal uncle; Stroke in her paternal grandfather; Suicide Attempts in her mother.     PAST MEDICAL HISTORY   has a past medical history of Acid reflux, ADHD, Allergic, Anxiety, Asthma, Attention deficit hyperactivity disorder (ADHD), combined type (10/9/2023), Depression, Gastritis, Headache, Irritable bowel syndrome, Mental problems, Neuromuscular disorder, Panic disorder (2017), Scoliosis, and Visual impairment.    She has no past medical history of Alcohol abuse, Alcoholism, Anorexia nervosa, Autism spectrum disorder, Bipolar disorder, Borderline personality disorder, Bulimia nervosa, Cancer, Chronic pain disorder, Disease of thyroid gland, Head injury, HIV disease, Liver disease, Obsessive-compulsive disorder, Oppositional defiant disorder, Psychosis, PTSD (post-traumatic stress disorder), Schizoaffective disorder, Seizures, Self-injurious behavior, Substance abuse, Suicide attempt, Violence, history of, Withdrawal symptoms, alcohol, or  Withdrawal symptoms, drug or narcotic.     Review of Systems   Constitutional:  Positive for activity change and fatigue.   HENT:  Positive for postnasal drip and sneezing.    Respiratory:  Positive for cough.    Cardiovascular: Negative.    Neurological:  Positive for memory problem.   Psychiatric/Behavioral:  Positive for decreased concentration, sleep disturbance and depressed mood. The patient is nervous/anxious.      Objective   Physical Exam  Constitutional:       Appearance: Normal appearance.   HENT:      Head: Normocephalic.   Pulmonary:      Effort: Pulmonary effort is normal.   Skin:     General: Skin is dry.   Neurological:      General: No focal deficit present.      Mental Status: She is alert and oriented to person, place, and time.   Psychiatric:         Mood and Affect: Mood normal.         Behavior: Behavior normal.         Thought Content: Thought content normal.       VITALS:  Wt Readings from Last 3 Encounters:   12/15/23 73.8 kg (162 lb 11.2 oz)   10/27/23 72.7 kg (160 lb 3.2 oz)   09/05/23 72.9 kg (160 lb 11.2 oz)     BP Readings from Last 3 Encounters:   12/15/23 102/62   10/27/23 121/75   09/05/23 118/78     Pulse Readings from Last 3 Encounters:   12/15/23 90   09/05/23 75   03/03/23 85     PHQ-9 Depression Screening  Little interest or pleasure in doing things? (P) 1-->several days   Feeling down, depressed, or hopeless? (P) 1-->several days   Trouble falling or staying asleep, or sleeping too much? (P) 3-->nearly every day   Feeling tired or having little energy?     Poor appetite or overeating? (P) 1-->several days   Feeling bad about yourself/you are a failure/have let yourself or your family down? (P) 0-->not at all   Trouble concentrating on things? (P) 3-->nearly every day   Psychomotor agitation/retardation (P) 1-->several days   Thoughts about death/dying/suicide (P) 0-->not at all   PHQ-9 Total Score (P) 13   How difficult have these problems for you? (P) very difficult     GAD7  Documentation:  Feeling nervous, anxious or on edge (P) 1   Not being able to stop or control worrying (P) 1   Worrying too much about different things (P) 1   Trouble relaxing (P) 2   Being so restless that it is hard to sit still (P) 1   Becoming easily annoyed or irritable (P) 1   Feeling Afraid as if something awful might happen (P) 0   KAREN Total Score (P) 7   How difficult have these problems made it for you? (P) Somewhat difficult     MENTAL STATUS EXAM   General Appearance:  Cleanly groomed and dressed  Eye Contact:  Good eye contact  Attitude:  Cooperative  Speech:  Rambling  Mood and affect:  Flat  Thought Process:  Goal-directed  Associations/ Thought Content:  No delusions  Hallucinations:  None  Suicidal Ideations:  Not present  Homicidal Ideation:  Not present  Sensorium:  Alert  Orientation:  Person, place, time and situation  Fund of Knowledge:  Appropriate for age and educational level  Intellectual Functioning:  Average range  Insight:  Fair  Judgement:  Fair  Reliability:  Fair  Impulse Control:  Fair     LABS:  Office Visit on 12/15/2023   Component Date Value Ref Range Status    Rapid Strep A Screen 12/15/2023 Positive (A)  Negative, VALID, INVALID, Not Performed Final    Internal Control 12/15/2023 Passed  Passed Final    Lot Number 12/15/2023 [id number]   Final    Expiration Date 12/15/2023 11/19/2024   Final    SARS Antigen 12/15/2023 Not Detected  Not Detected, Presumptive Negative Final    Influenza A Antigen TREE 12/15/2023 Not Detected  Not Detected Final    Influenza B Antigen TREE 12/15/2023 Not Detected  Not Detected Final    Internal Control 12/15/2023 Passed  Passed Final    Lot Number 12/15/2023 [id number]   Final    Expiration Date 12/15/2023 11/10/2024   Final      Allergies   Allergen Reactions    Wellbutrin [Bupropion] Other (See Comments)     Worsening depression.     Current Outpatient Medications   Medication Instructions    ALBUTEROL IN 1 puff, Inhalation, Every 4 Hours PRN     ALPRAZolam (XANAX) 0.25 mg, Oral, 2 Times Daily PRN    Cetirizine-Pseudoephedrine (ZYRTEC-D PO) Oral    escitalopram (LEXAPRO) 20 mg, Oral, Daily    fluticasone (VERAMYST) 27.5 MCG/SPRAY nasal spray 2 sprays, Nasal, Daily    fluticasone-salmeterol (Advair HFA) 115-21 MCG/ACT inhaler 2 puffs, Inhalation, 2 Times Daily - RT    ibuprofen (ADVIL,MOTRIN) 100 mg, Oral    Jolessa 0.15-0.03 MG per tablet 1 tablet, Oral, Daily    lidocaine (LIDODERM) 5 % 1 patch, Transdermal, Every 24 Hours, Remove & Discard patch within 12 hours or as directed by MD    lisdexamfetamine (VYVANSE) 30 mg, Oral, Every Morning    montelukast (SINGULAIR) 10 MG tablet TAKE 1 TABLET BY MOUTH EVERYDAY AT NIGHT    traZODone (DESYREL)  mg, Oral, Nightly PRN     Assessment    ASSESSMENT/TREATMENT PLAN/INSTRUCTIONS/EDUCATION    (F90.2) Attention deficit hyperactivity disorder (ADHD), combined type - Plan: lisdexamfetamine (Vyvanse) 30 MG capsule    (Z79.899) High risk medication use - Plan: lisdexamfetamine (Vyvanse) 30 MG capsule    (F32.A) Depression, unspecified depression type - Plan: traZODone (DESYREL) 100 MG tablet    (G47.00) Insomnia, unspecified type - Plan: traZODone (DESYREL) 100 MG tablet    (F41.1,  F41.0) Generalized anxiety disorder with panic attacks     -ADHD: Unchanged with treatment.  Intuniv that was ordered last was ineffective and led to side effects including headaches, patient has trialed Strattera previously, Wellbutrin tried previously that led to SI, patient has history of being on Adderall immediate and extended release that resulted in mood swings.  Different treatment options discussed including long acting stimulant Vyvanse and nonstimulant Qelbree/viloxazine.    -Depression/anxiety: Only partially treated, has only been on higher dose of Lexapro 20 mg X 2 weeks.  -Insomnia, unchanged    AFTER VISIT SUMMARY INSTRUCTIONS:    Medication changes:   Discontinue methylphenidate  Start Vyvanse 30 mg take first thing in  the morning, PA is likely, will let you know if we hear anything from insurance, last PA was denied due to needing to trial methylphenidate first.  Start trazodone 100 mg tablet, take 1/2 to 1 tablet at bedtime as needed for sleep, take and be in bed within 30 minutes, give yourself a full 7 to 8-hour time window devoted to sleep, do not take then drive a car or do something where you can injure yourself, do not mix with alcohol or other sedating medications  Lexapro 20 mg, continue as previously ordered    FOLLOW UP: Return in about 4 weeks (around 2/1/2024) for Video visit.    Medications Discontinued During This Encounter   Medication Reason    methylphenidate LA (Ritalin LA) 10 MG 24 hr capsule Not Efficacious     New Medications Ordered This Visit   Medications    lisdexamfetamine (Vyvanse) 30 MG capsule     Sig: Take 1 capsule by mouth Every Morning     Dispense:  30 capsule     Refill:  0     Pt failed methylphenidate, intuniv, adderal    traZODone (DESYREL) 100 MG tablet     Sig: Take 0.5-1 tablets by mouth At Night As Needed for Sleep.     Dispense:  30 tablet     Refill:  0        No orders of the defined types were placed in this encounter.    -Barriers: history of multiple failed medications due to lack of efficacy and/or side effects , national stimulant shortage  -Strengths:  motivated     -Progress toward goal: Not at goal  -Functional Status: Moderate impairment   -Prognosis: Fair with Ongoing Treatment        SUMMARY/DISCUSSION:  Pt past social/medical/family history reviewed/updated. ROS conducted, medications/allergies, reviewed.  Most recent vitals/labs reviewed. Pt was given appropriate time to ask questions and concerns were addressed. A thorough discussion was had that included review of disease process, need for continued monitoring and additional treatment options including use of pharmacological and non-pharmacological approaches to care, decisions were made and agreed upon by patient and  provider. Discussed the risks, benefits, and potential side effects of the medications; patient ackowledged and verbally consented.     Part of this note may be an electronic transcription/translation of spoken language to printed text using the Dragon Dictation System. Some of the data in this electronic note has been brought forward from a previous encounter, any necessary changes have been made, it has been reviewed by this APRN, and it is accurate.    This document has been electronically signed by JEAN Barrios January 4, 2024 16:28 EST

## 2024-01-04 ENCOUNTER — TELEMEDICINE (OUTPATIENT)
Dept: BEHAVIORAL HEALTH | Facility: CLINIC | Age: 34
End: 2024-01-04
Payer: OTHER GOVERNMENT

## 2024-01-04 DIAGNOSIS — F90.2 ATTENTION DEFICIT HYPERACTIVITY DISORDER (ADHD), COMBINED TYPE: Primary | ICD-10-CM

## 2024-01-04 DIAGNOSIS — F41.0 GENERALIZED ANXIETY DISORDER WITH PANIC ATTACKS: ICD-10-CM

## 2024-01-04 DIAGNOSIS — G47.00 INSOMNIA, UNSPECIFIED TYPE: ICD-10-CM

## 2024-01-04 DIAGNOSIS — F32.A DEPRESSION, UNSPECIFIED DEPRESSION TYPE: ICD-10-CM

## 2024-01-04 DIAGNOSIS — F41.1 GENERALIZED ANXIETY DISORDER WITH PANIC ATTACKS: ICD-10-CM

## 2024-01-04 DIAGNOSIS — Z79.899 HIGH RISK MEDICATION USE: ICD-10-CM

## 2024-01-04 RX ORDER — TRAZODONE HYDROCHLORIDE 100 MG/1
50-100 TABLET ORAL NIGHTLY PRN
Qty: 30 TABLET | Refills: 0 | Status: SHIPPED | OUTPATIENT
Start: 2024-01-04

## 2024-01-04 RX ORDER — LISDEXAMFETAMINE DIMESYLATE CAPSULES 30 MG/1
30 CAPSULE ORAL EVERY MORNING
Qty: 30 CAPSULE | Refills: 0 | Status: SHIPPED | OUTPATIENT
Start: 2024-01-04

## 2024-01-04 NOTE — PATIENT INSTRUCTIONS
Medication changes:   Discontinue methylphenidate  Start Vyvanse 30 mg take first thing in the morning, PA is likely, will let you know if we hear anything from insurance, last PA was denied due to needing to trial methylphenidate first.  Start trazodone 100 mg tablet, take 1/2 to 1 tablet at bedtime as needed for sleep, take and be in bed within 30 minutes, give yourself a full 7 to 8-hour time window devoted to sleep, do not take then drive a car or do something where you can injure yourself, do not mix with alcohol or other sedating medications  Lexapro 20 mg, continue as previously ordered  Please read attached handout on sleep hygiene and new medication trazodone    Stimulant/ADHD General Instructions:  Patient educated that they must call every month when they have 3 days left of medication to request a refill [phone].  Patient educated that prescribing will not continue unless follow-up appointments are maintained.  Prescriptions can only be sent to a [place] pharmacy, 30-day supply no refills.  Monitor for appetite suppression/weight loss, worsening anxiety, worsening sleep.  Remember to stay hydrated and snack throughout the day to avoid afternoon crash.  Any cardiovascular symptoms including chest pain, shortness of breath, dizziness, lightheadedness, stop medication and go to nearest emergency room or call 911.  Patient has been educated on the risk versus benefit of being prescribed a controlled substance, patient has been educated on additional monitoring that is required including Damián reports, random urine drug screens, in person appt's and pill counts. Controlled substance agreement renewed yearly. Patients are seen regularly while on a controlled substance.  Patient has been educated on nonstimulant options that carry with a much lower risk, and require less monitoring.

## 2024-01-12 DIAGNOSIS — T88.7XXA SIDE EFFECT OF MEDICATION: ICD-10-CM

## 2024-01-12 DIAGNOSIS — G47.00 INSOMNIA, UNSPECIFIED TYPE: Primary | ICD-10-CM

## 2024-01-12 RX ORDER — HYDROXYZINE HYDROCHLORIDE 10 MG/1
10-20 TABLET, FILM COATED ORAL NIGHTLY PRN
Qty: 60 TABLET | Refills: 0 | Status: SHIPPED | OUTPATIENT
Start: 2024-01-12

## 2024-01-18 DIAGNOSIS — F90.2 ATTENTION DEFICIT HYPERACTIVITY DISORDER (ADHD), COMBINED TYPE: Primary | ICD-10-CM

## 2024-01-23 ENCOUNTER — PRIOR AUTHORIZATION (OUTPATIENT)
Dept: BEHAVIORAL HEALTH | Facility: CLINIC | Age: 34
End: 2024-01-23
Payer: OTHER GOVERNMENT

## 2024-01-23 NOTE — TELEPHONE ENCOUNTER
Initial SW/CM Assessment/Plan of Care Note     Baseline Assessment  51 year old admitted 1/6/2022 as Inpatient with a diagnosis of covid pneumonia. Prior to admission patient was living with Other (Comment),Family members (Aunt, & family) and residing at House.  Patient does not  have a Power of  for Healthcare.  Document is not activated. Patient’s Primary Care Provider is Pcp Not In System. Patient is  follows up with Dr. Schaefer for his Pancreatic cancer treatments..    Medical History  Past Medical History:   Diagnosis Date   • High cholesterol    • Pancreatic cancer (CMS/HCC)     on chemo    • Polio        Prior to Admission Status  Functional Status  Ambulation: Independent/Self  Bathing: Independent/Self  Dressing: Independent/Self  Toileting: Independent/Self  Meal Preparation: Independent/Self  Shopping: Independent/Self  Medication Preparation: Independent/Self  Medication Administration: Independent/Self  Housekeeping: Independent/Self  Laundry: Independent/Self  Transportation: Taxi    Agency/Support  Type of Services Prior to Hospitalization: None  Support Systems: Family members  Home Devices/Equipment: None  Mobility Assist Devices: None  Sensory Support Devices: Dentures    Current Status  PT Ambulation Tips:    PT Transfer Tips:     OT Bathing Tips:    OT Dressing Tips:    OT Toileting Tips:    OT Feeding Tips:    SLP Swallow/Feeding Tips:    SLP Comm/Cog Tips:    Current Mental Status: Alert,Oriented to Person,Oriented to Place,Oriented to Reason for Hospitalization,Oriented to Time  Stressors:      Insurance  Primary: [employer]  Secondary: N/A    Barriers to Discharge  Identified Barriers to Discharge/Transition Planning: No barriers    Progress Note  CM spoke with patient via phone with the use of interpretor Freida #[id number] to complete assessment. Patient is currently being [facility] by [facility]er [facility] for metastatic pancreatic cancer. Patient lives w/family in a  PA initiated for Qelbree 200mg via fax to [facility]. Awaiting response.   house. He states that he has his own space that he stays in and he is able to care for self at this time. CM attempted to get patient to agree to scheduling a Hospital follow up appointment with [facility]. Patient wants to only see Dr. Schaefer. Info for [facility] has been placed in AVS, CM contacted Marta Katie in the Cancer Center to update her on patient's current admission.     Patient will likely discharge later today, no further needs at this time. Per patient his anh العلي will pick him up.      Plan  VINNY/MANDO - Recommendations for Discharge:     PT - Recommendations for Discharge:      OT - Recommendations for Discharge:      SLP - Recommendations for Discharge:     Anticipate patient will need post-hospital services. Necessary services are available.  Anticipate patient can return to the environment from which patient entered the hospital.   Anticipate patient can provide self-care at discharge.    Refer to SW/CM Flowsheet for Goals and objective data.

## 2024-01-25 NOTE — TELEPHONE ENCOUNTER
[facility] approved for [facility]. Patient notified. Approval letter uploaded.

## 2024-02-01 DIAGNOSIS — G47.00 INSOMNIA, UNSPECIFIED TYPE: ICD-10-CM

## 2024-02-01 DIAGNOSIS — F32.A DEPRESSION, UNSPECIFIED DEPRESSION TYPE: ICD-10-CM

## 2024-02-01 RX ORDER — TRAZODONE HYDROCHLORIDE 100 MG/1
50-100 TABLET ORAL NIGHTLY PRN
Qty: 30 TABLET | Refills: 0 | OUTPATIENT
Start: 2024-02-01

## 2024-04-19 ENCOUNTER — TELEPHONE (OUTPATIENT)
Dept: BEHAVIORAL HEALTH | Facility: CLINIC | Age: 34
End: 2024-04-19
Payer: OTHER GOVERNMENT

## 2024-04-19 DIAGNOSIS — F90.2 ATTENTION DEFICIT HYPERACTIVITY DISORDER (ADHD), COMBINED TYPE: Primary | ICD-10-CM

## 2024-04-19 DIAGNOSIS — Z79.899 HIGH RISK MEDICATION USE: ICD-10-CM

## 2024-04-19 RX ORDER — LISDEXAMFETAMINE DIMESYLATE CAPSULES 30 MG/1
30 CAPSULE ORAL EVERY MORNING
Qty: 30 CAPSULE | Refills: 0 | Status: SHIPPED | OUTPATIENT
Start: 2024-04-19

## 2024-04-19 NOTE — TELEPHONE ENCOUNTER
Patient called stating the pharmacy contacted her and said they finally have generic vyvanse in stock for the patient to receive, but it requires a new order. Please see patient message from 01/05/24 for this information. Please advise.    LAST VISIT - 01/04/24  NEXT VISIT - not scheduled, patient advised she may need to be scheduled before this is prescribed.

## 2024-04-19 NOTE — TELEPHONE ENCOUNTER
I went ahead and sent an order for the Vyvanse but patient needs to schedule an appointment ASAP has not been seen since January.

## 2024-04-26 ENCOUNTER — TELEMEDICINE (OUTPATIENT)
Dept: BEHAVIORAL HEALTH | Facility: CLINIC | Age: 34
End: 2024-04-26
Payer: OTHER GOVERNMENT

## 2024-04-26 DIAGNOSIS — F41.1 GENERALIZED ANXIETY DISORDER WITH PANIC ATTACKS: ICD-10-CM

## 2024-04-26 DIAGNOSIS — F32.A DEPRESSION, UNSPECIFIED DEPRESSION TYPE: ICD-10-CM

## 2024-04-26 DIAGNOSIS — F41.0 GENERALIZED ANXIETY DISORDER WITH PANIC ATTACKS: ICD-10-CM

## 2024-04-26 DIAGNOSIS — F90.2 ATTENTION DEFICIT HYPERACTIVITY DISORDER (ADHD), COMBINED TYPE: Primary | ICD-10-CM

## 2024-04-26 DIAGNOSIS — Z79.899 HIGH RISK MEDICATION USE: ICD-10-CM

## 2024-04-26 DIAGNOSIS — G47.00 INSOMNIA, UNSPECIFIED TYPE: ICD-10-CM

## 2024-04-26 NOTE — PROGRESS NOTES
Patient assessed today via MyChart through [facility] pt is at home in a secure environment and verbalizes privacy during interview. MELISSA Carranza is at home in a secure environment using a secure laptop.The patient's condition being diagnosed/treated is appropriate for telemedicine.The provider identified himself as well as his credentials.The patient, and/or patient's guardian, consent to be seen remotely, and when consent is given they understand that the consent allows for patient identifiable information to be sent to a third party as needed. They may refuse to be seen remotely at any time. The electronic data is encrypted and password protected, and the patient and/or guardian has been advised of the potential risks to privacy not withstanding such measures.    Subjective    PATIENT ID: Giovanna Lao, :1990, MRN: [id number]    Chief Complaint   Patient presents with    ADHD    Depression    Insomnia    Follow-up     HPI:  33 y.o. female presents to [facility] for F/U. Pt reached out to office semi-recently requesting ADHD medication after HX of trialing multiple meds that were not tolerated, has been on vyvnase 30 mg for around 5-6 days. Multiple issues brought up by pt, ADHD was initially main focus, than chronic insomnia, neuro-psych testing, sleep study, and mood instability, pt last seen around 3 months ago prior to that was October, both times pt did not f/u as planned. 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 Prescribed hydroxyzine in the past can't remember if she took it or had S/E, will try at home and let me know. Concentration/focus improved but not impulsivity/interrupting others (intuniv in the past, not effective).    S/E to medications: Trazodone X few days (dizzy, seeing stars in the morning) even with 1/2 tablet.    Sleep: Chronic issues, falling and staying asleep  + snore, no history of sleep study, melatonin, relaxation  techniques, trazodone HX not helping, takes 2 hours to fall asleep. Gets 5-6 hrs (baseline), has a toddler    PHQ: (P) 9    KAREN: (P) 4    New Medications: Denies    New Medical Problems: Denies    Major changes since last time being seen: Denies      SUBSTANCE/SEXUAL HISTORY:  Social History     Socioeconomic History    Marital status:    Tobacco Use    Smoking status: Never    Smokeless tobacco: Never   Vaping Use    Vaping status: Never Used   Substance and Sexual Activity    Alcohol use: Yes     Alcohol/week: 2.0 standard drinks of alcohol     Types: 2 Cans of beer per week     Comment: Usually just 1, never more than 2.    Drug use: Never    Sexual activity: Yes     Partners: Male     Birth control/protection: Birth control pill     Comment: 91 day pack      FAMILY HISTORY:  family history includes ADD / ADHD in her brother; Alcohol abuse in her mother; Anxiety disorder in her mother; Asthma in her mother; Bipolar disorder in her brother; Depression in her brother, father, and mother; Diabetes in her paternal grandfather; Glaucoma in her maternal grandmother; Heart attack in her paternal grandfather; Hypertension in her father; Nephrolithiasis in her mother; No Known Problems in her paternal grandmother; Paranoid behavior in her father; Schizophrenia in her maternal uncle; Stroke in her paternal grandfather; Suicide Attempts in her mother.     PAST MEDICAL HISTORY   has a past medical history of Acid reflux, ADHD, Allergic, Anxiety, Asthma, Attention deficit hyperactivity disorder (ADHD), combined type (10/9/2023), Depression, Gastritis, Headache, Irritable bowel syndrome, Mental problems, Neuromuscular disorder, Panic disorder (2017), Scoliosis, and Visual impairment.    She has no past medical history of Alcohol abuse, Alcoholism, Anorexia nervosa, Autism spectrum disorder, Bipolar disorder, Borderline personality disorder, Bulimia nervosa, Cancer, Chronic pain disorder, Disease of thyroid gland, Head  injury, HIV disease, Liver disease, Obsessive-compulsive disorder, Oppositional defiant disorder, Psychosis, PTSD (post-traumatic stress disorder), Schizoaffective disorder, Seizures, Self-injurious behavior, Substance abuse, Suicide attempt, Violence, history of, Withdrawal symptoms, alcohol, or Withdrawal symptoms, drug or narcotic.     Review of Systems   Constitutional:  Positive for fatigue. Negative for chills and fever.   Respiratory:  Negative for chest tightness and shortness of breath.    Cardiovascular:  Negative for chest pain.   Gastrointestinal:  Negative for nausea and vomiting.   Neurological:  Negative for dizziness and light-headedness.   Psychiatric/Behavioral:  Positive for agitation, decreased concentration, sleep disturbance, depressed mood and stress. Negative for suicidal ideas.      Objective   PHYSICAL EXAM:  Constitutional:       Appearance: Normal appearance.   HENT:      Head: Normocephalic.   Pulmonary:      Effort: Pulmonary effort is normal.   Skin:     General: Skin is dry.   Neurological:      Mental Status: He/She/They are alert and oriented to person, place, and time.     VITALS:  Wt Readings from Last 3 Encounters:   12/15/23 73.8 kg (162 lb 11.2 oz)   10/27/23 72.7 kg (160 lb 3.2 oz)   09/05/23 72.9 kg (160 lb 11.2 oz)     BP Readings from Last 3 Encounters:   12/15/23 102/62   10/27/23 121/75   09/05/23 118/78     Pulse Readings from Last 3 Encounters:   12/15/23 90   09/05/23 75   03/03/23 85     PHQ-9 Depression Screening  Little interest or pleasure in doing things? (P) 1-->several days   Feeling down, depressed, or hopeless? (P) 0-->not at all   Trouble falling or staying asleep, or sleeping too much? (P) 3-->nearly every day   Feeling tired or having little energy?     Poor appetite or overeating? (P) 1-->several days   Feeling bad about yourself/you are a failure/have let yourself or your family down? (P) 0-->not at all   Trouble concentrating on things? (P) 1-->several  days   Psychomotor agitation/retardation (P) 0-->not at all   Thoughts about death/dying/suicide (P) 0-->not at all   PHQ-9 Total Score (P) 9   How difficult have these problems for you? (P) somewhat difficult     GAD7 Documentation:  Feeling nervous, anxious or on edge (P) 0   Not being able to stop or control worrying (P) 1   Worrying too much about different things (P) 1   Trouble relaxing (P) 1   Being so restless that it is hard to sit still (P) 0   Becoming easily annoyed or irritable (P) 1   Feeling Afraid as if something awful might happen (P) 0   KAREN Total Score (P) 4   How difficult have these problems made it for you? (P) Somewhat difficult     MENTAL STATUS EXAM   General Appearance:  Cleanly groomed and dressed  Eye Contact:  Good eye contact  Attitude:  Cooperative  Speech:  Normal rate, tone, volume  Mood and affect:  Normal, pleasant  Thought Process:  Goal-directed  Associations/ Thought Content:  No delusions  Hallucinations:  None  Suicidal Ideations:  Not present  Homicidal Ideation:  Not present  Sensorium:  Alert  Orientation:  Person, place, time and situation  Attention Span/ Concentration:  Selective attention  Fund of Knowledge:  Appropriate for age and educational level  Intellectual Functioning:  Average range  Insight:  Fair  Judgement:  Fair  Reliability:  Fair  Impulse Control:  Poor     LABS:  No visits with results within 1 Month(s) from this visit.   Latest known visit with results is:   Office Visit on 12/15/2023   Component Date Value Ref Range Status    Rapid Strep A Screen 12/15/2023 Positive (A)  Negative, VALID, INVALID, Not Performed Final    Internal Control 12/15/2023 Passed  Passed Final    Lot Number 12/15/2023 [id number]   Final    Expiration Date 12/15/2023 11/19/2024   Final    SARS Antigen 12/15/2023 Not Detected  Not Detected, Presumptive Negative Final    Influenza A Antigen TREE 12/15/2023 Not Detected  Not Detected Final    Influenza B Antigen TREE 12/15/2023 Not  Detected  Not Detected Final    Internal Control 12/15/2023 Passed  Passed Final    Lot Number 12/15/2023 [id number]   Final    Expiration Date 12/15/2023 11/10/2024   Final      Allergies   Allergen Reactions    Wellbutrin [Bupropion] Other (See Comments)     Worsening depression.     Current Outpatient Medications   Medication Instructions    ALBUTEROL IN 1 puff, Inhalation, Every 4 Hours PRN    escitalopram (LEXAPRO) 20 mg, Oral, Daily    fluticasone-salmeterol (Advair HFA) 115-21 MCG/ACT inhaler 2 puffs, Inhalation, 2 Times Daily - RT    hydrOXYzine (ATARAX) 10-20 mg, Oral, Nightly PRN    Jolessa 0.15-0.03 MG per tablet 1 tablet, Oral, Daily    lidocaine (LIDODERM) 5 % 1 patch, Transdermal, Every 24 Hours, Remove & Discard patch within 12 hours or as directed by MD    lisdexamfetamine (VYVANSE) 30 mg, Oral, Every Morning     Assessment    ASSESSMENT/TREATMENT PLAN/INSTRUCTIONS/EDUCATION    (F90.2) Attention deficit hyperactivity disorder (ADHD), combined type    (F41.1,  F41.0) Generalized anxiety disorder with panic attacks    (F32.A) Depression, unspecified depression type (need to R/O bipolar or borderline)    (G47.00) Insomnia, unspecified type    (Z79.899) High risk medication use     ADHD, improving somewhat continue vyvanse 30 mg/am  Anxiety, stable, continue lexapro 20 mg/day  Mood issue (highs and lows, change few times a month, no bipolar DX, no mood stabilizer, can cry out of nowhere, no SI when low, HX SI with Wellbutin, agitation X 1 week, poor sleep (newly identified), need to R/O bipolar vs borderline, consider Lamictal  Insomnia, unchanged, consider referral to sleep medicine, consider Seroquel/atarax/clonidine  High risk medication yanely, need narc contract + vitals, MA to schedule  F/U 4 weeks    FOLLOW UP: Return in about 4 weeks (around 5/24/2024) for Recheck, IN PERSON.    Medications Discontinued During This Encounter   Medication Reason    ALPRAZolam (XANAX) 0.25 MG tablet Historical Med -  Therapy completed    Viloxazine HCl  MG capsule sustained-release 24 hr Not Efficacious               No orders of the defined types were placed in this encounter.    -Barriers: history of multiple failed medications due to lack of efficacy and/or side effects , national stimulant shortage  -Strengths:  motivated     -Short-Term Goals: Pt will be compliant with medication management and note improvement in S/S over the next 4 to 6 weeks or at next scheduled visit.  -Long-Term Goals: Pt will be compliant with the agreed treatment plan including medication regimen & F/U appt's and deny impairment in daily functioning over the next 6 months.      -Progress toward goal: Not at goal  -Functional Status: Moderate impairment   -Prognosis: Fair with Ongoing Treatment        SUMMARY/DISCUSSION:  Pt past social/medical/family history reviewed/updated. ROS conducted, medications/allergies, reviewed.  Most recent vitals/labs reviewed. Pt was given appropriate time to ask questions and concerns were addressed. A thorough discussion was had that included review of disease process, need for continued monitoring and additional treatment options including use of pharmacological and non-pharmacological approaches to care, decisions were made and agreed upon by patient and provider. Discussed the risks, benefits, and potential side effects of the medications; patient ackowledged and verbally consented.     Part of this note may be an electronic transcription/translation of spoken language to printed text using the Dragon Dictation System. Some of the data in this electronic note has been brought forward from a previous encounter, any necessary changes have been made, it has been reviewed by this APRN, and it is accurate.    This document has been electronically signed by JEAN Barrios April 26, 2024 17:06 EDT

## 2024-04-26 NOTE — PATIENT INSTRUCTIONS
-Stimulant/ADHD General Instructions:  Patient educated that they must call every month when they have 3 days left of medication to request a refill [phone].  Patient educated that prescribing will not continue unless follow-up appointments are maintained.  Prescriptions can only be sent to a [place] pharmacy, 30-day supply no refills.  Monitor for appetite suppression/weight loss, worsening anxiety, worsening sleep.  Remember to stay hydrated and snack throughout the day to avoid afternoon crash.  Any cardiovascular symptoms including chest pain, shortness of breath, dizziness, lightheadedness, stop medication and go to nearest emergency room or call 911.  Patient has been educated on the risk versus benefit of being prescribed a controlled substance, patient has been educated on additional monitoring that is required including Damián reports, random urine drug screens, in person appt's and pill counts. Controlled substance agreement renewed yearly. Patients are seen regularly while on a controlled substance.  Patient has been educated on nonstimulant options that carry with a much lower risk, and require less monitoring.     GENERAL NEW PATIENT INSTRUCTIONS:    -Please arrive in person or virtually 10-15 minutes prior to appointment to allow for registration/sign in/questionnaires. If you are seen virtually please review after visit summary (AVS)/patient instructions via [employer] for a summary of plan of care/changes in treatment plan. If you are having difficulties logging on or accessing [employer] please contact my office for assistance.    -The best way to get a hold of Dillon is to call the office at [phone] and ask to leave a message with his medical assistant. Dillon Gilmore is a Psychiatric Mental Health Nurse Practitioner, due to his specialty patient's are not able to message him directly via [employer]. Please give my office up to 48 hours to respond to a patient call/question/refill request. Refill  requests will be made during normal office hours only, Monday-Friday 8:00-5:30.      -Dillon is out of the office on Wednesdays and weekends. Tuesdays and Thursdays are Dillon's in-office days, Mondays and Fridays are his telehealth days.  The decision to be seen virtually or in person is up to the discretion of the provider, not all behavioral health problems are appropriate for telehealth.    -Please call the office at [phone] with any worsening of symptoms or onset of intolerable side effects.  -Follow-up appointments must be maintained in order for prescribing to continue.  -Patient has been educated regarding multimodal approach with healthy nutrition, healthy sleep, regular physical activity, social activities, counseling, and medications.   -Please call 911 or go to the nearest ER if you begin to have thoughts of harming yourself or other people.

## 2024-04-29 ENCOUNTER — TELEPHONE (OUTPATIENT)
Dept: BEHAVIORAL HEALTH | Facility: CLINIC | Age: 34
End: 2024-04-29
Payer: OTHER GOVERNMENT

## 2024-04-29 NOTE — TELEPHONE ENCOUNTER
LVM for patient to return call. Next follow-up with Satinder is scheduled for mychart video visit, Satinder requests for it to be in person to renew narcotics contract and get vital signs.

## 2024-05-01 DIAGNOSIS — G47.00 INSOMNIA, UNSPECIFIED TYPE: ICD-10-CM

## 2024-05-01 DIAGNOSIS — T88.7XXA SIDE EFFECT OF MEDICATION: ICD-10-CM

## 2024-05-01 RX ORDER — HYDROXYZINE HYDROCHLORIDE 10 MG/1
10-20 TABLET, FILM COATED ORAL NIGHTLY PRN
Qty: 60 TABLET | Refills: 2 | Status: SHIPPED | OUTPATIENT
Start: 2024-05-01

## 2024-05-02 DIAGNOSIS — F90.2 ATTENTION DEFICIT HYPERACTIVITY DISORDER (ADHD), COMBINED TYPE: Primary | ICD-10-CM

## 2024-05-02 DIAGNOSIS — F32.A DEPRESSION, UNSPECIFIED DEPRESSION TYPE: ICD-10-CM

## 2024-05-02 DIAGNOSIS — F41.0 GENERALIZED ANXIETY DISORDER WITH PANIC ATTACKS: ICD-10-CM

## 2024-05-02 DIAGNOSIS — F41.1 GENERALIZED ANXIETY DISORDER WITH PANIC ATTACKS: ICD-10-CM

## 2024-05-07 ENCOUNTER — TELEPHONE (OUTPATIENT)
Dept: INTERNAL MEDICINE | Facility: CLINIC | Age: 34
End: 2024-05-07

## 2024-05-07 NOTE — TELEPHONE ENCOUNTER
Caller: Giovanna Lao    Relationship: Self    Best call back number: [phone]     What medication are you requesting: ORAL ANTIBIOTIC    What are your current symptoms: RASH AROUND MOUTH/PATIENT STATES SHE GETS IMPETIGO ONCE A YEAR     How long have you been experiencing symptoms: STARTED YESTERDAY     Have you had these symptoms before:    [x] Yes  [] No    Have you been treated for these symptoms before:   [x] Yes  [] No    If a prescription is needed, what is your preferred pharmacy and phone number: [id number] [id number] AT [facility] [phone] [facility] [phone]      Additional notes:  PATIENT HAS OINTMENT BUT IT IS GETTING WORSE AND WOULD LIKE AN ORAL ANTIBIOTIC CALLED IN.

## 2024-05-07 NOTE — TELEPHONE ENCOUNTER
OFFERED SAME DAY APPOINTMENT. PATIENT SAID SHE COULD NOT LEAVE WORK EARLY AND SHE WOULD GO TO  AFTER WORK.

## 2024-05-16 DIAGNOSIS — Z79.899 HIGH RISK MEDICATION USE: ICD-10-CM

## 2024-05-16 DIAGNOSIS — F90.2 ATTENTION DEFICIT HYPERACTIVITY DISORDER (ADHD), COMBINED TYPE: ICD-10-CM

## 2024-05-17 RX ORDER — LISDEXAMFETAMINE DIMESYLATE 30 MG/1
30 CAPSULE ORAL EVERY MORNING
Qty: 30 CAPSULE | Refills: 0 | Status: SHIPPED | OUTPATIENT
Start: 2024-05-17

## 2024-05-28 ENCOUNTER — OFFICE VISIT (OUTPATIENT)
Dept: BEHAVIORAL HEALTH | Facility: CLINIC | Age: 34
End: 2024-05-28
Payer: OTHER GOVERNMENT

## 2024-05-28 VITALS
SYSTOLIC BLOOD PRESSURE: 108 MMHG | DIASTOLIC BLOOD PRESSURE: 78 MMHG | WEIGHT: 164 LBS | BODY MASS INDEX: 25.74 KG/M2 | HEART RATE: 73 BPM | OXYGEN SATURATION: 98 % | HEIGHT: 67 IN | RESPIRATION RATE: 18 BRPM

## 2024-05-28 DIAGNOSIS — Z79.899 HIGH RISK MEDICATION USE: ICD-10-CM

## 2024-05-28 DIAGNOSIS — G47.00 INSOMNIA, UNSPECIFIED TYPE: ICD-10-CM

## 2024-05-28 DIAGNOSIS — F90.2 ATTENTION DEFICIT HYPERACTIVITY DISORDER (ADHD), COMBINED TYPE: Primary | ICD-10-CM

## 2024-05-28 DIAGNOSIS — F32.A DEPRESSION, UNSPECIFIED DEPRESSION TYPE: ICD-10-CM

## 2024-05-28 PROCEDURE — 99214 OFFICE O/P EST MOD 30 MIN: CPT

## 2024-05-28 RX ORDER — LAMOTRIGINE 25 MG/1
TABLET ORAL
Qty: 98 TABLET | Refills: 0 | Status: CANCELLED | OUTPATIENT
Start: 2024-05-28 | End: 2024-07-08

## 2024-05-28 RX ORDER — QUETIAPINE FUMARATE 25 MG/1
25-50 TABLET, FILM COATED ORAL NIGHTLY PRN
Qty: 60 TABLET | Refills: 2 | Status: CANCELLED | OUTPATIENT
Start: 2024-05-28

## 2024-05-28 NOTE — PATIENT INSTRUCTIONS
-Stimulant/ADHD General Instructions:  Patient educated that they must call every month when they have 3 days left of medication to request a refill [phone].  Patient educated that prescribing will not continue unless follow-up appointments are maintained.  Prescriptions can only be sent to a [place] pharmacy, 30-day supply no refills.  Monitor for appetite suppression/weight loss, worsening anxiety, worsening sleep.  Remember to stay hydrated and snack throughout the day to avoid afternoon crash.  Any cardiovascular symptoms including chest pain, shortness of breath, dizziness, lightheadedness, stop medication and go to nearest emergency room or call 911.  Patient has been educated on the risk versus benefit of being prescribed a controlled substance, patient has been educated on additional monitoring that is required including Damián reports, random urine drug screens, in person appt's and pill counts. Controlled substance agreement renewed yearly. Patients are seen regularly while on a controlled substance.  Patient has been educated on nonstimulant options that carry with a much lower risk, and require less monitoring.     GENERAL NEW PATIENT INSTRUCTIONS:    -Please arrive in person or virtually 10-15 minutes prior to appointment to allow for registration/sign in/questionnaires. If you are seen virtually please review after visit summary (AVS)/patient instructions via [employer] for a summary of plan of care/changes in treatment plan. If you are having difficulties logging on or accessing [employer] please contact my office for assistance.    -The best way to get a hold of Dillon is to call the office at [phone] and ask to leave a message with his medical assistant. Dillon Gilmore is a Psychiatric Mental Health Nurse Practitioner, due to his specialty patient's are not able to message him directly via [employer]. Please give my office up to 48 hours to respond to a patient call/question/refill request. Refill  requests will be made during normal office hours only, Monday-Friday 8:00-5:30.      -Dillon is out of the office on Fridays and weekends. Tuesdays and Thursdays are Dillon's in-office days, Mondays and Wednesdays are his telehealth days.  The decision to be seen virtually or in person is up to the discretion of the provider, not all behavioral health problems are appropriate for telehealth.    -Please call the office at [phone] with any worsening of symptoms or onset of intolerable side effects.  -Follow-up appointments must be maintained in order for prescribing to continue.  -Patient has been educated regarding multimodal approach with healthy nutrition, healthy sleep, regular physical activity, social activities, counseling, and medications.   -Please call 911 or go to the nearest ER if you begin to have thoughts of harming yourself or other people.
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
## 2024-05-31 ENCOUNTER — OFFICE VISIT (OUTPATIENT)
Dept: INTERNAL MEDICINE | Facility: CLINIC | Age: 34
End: 2024-05-31
Payer: OTHER GOVERNMENT

## 2024-05-31 VITALS
BODY MASS INDEX: 25.75 KG/M2 | WEIGHT: 164.1 LBS | DIASTOLIC BLOOD PRESSURE: 72 MMHG | HEIGHT: 67 IN | HEART RATE: 87 BPM | OXYGEN SATURATION: 98 % | SYSTOLIC BLOOD PRESSURE: 110 MMHG | TEMPERATURE: 98.3 F

## 2024-05-31 DIAGNOSIS — Z91.09 ENVIRONMENTAL ALLERGIES: ICD-10-CM

## 2024-05-31 DIAGNOSIS — L01.00 IMPETIGO: ICD-10-CM

## 2024-05-31 DIAGNOSIS — K64.9 HEMORRHOIDS, UNSPECIFIED HEMORRHOID TYPE: ICD-10-CM

## 2024-05-31 DIAGNOSIS — F32.A DEPRESSION, UNSPECIFIED DEPRESSION TYPE: ICD-10-CM

## 2024-05-31 DIAGNOSIS — Z00.00 HEALTHCARE MAINTENANCE: Primary | ICD-10-CM

## 2024-05-31 DIAGNOSIS — F90.2 ATTENTION DEFICIT HYPERACTIVITY DISORDER (ADHD), COMBINED TYPE: ICD-10-CM

## 2024-05-31 DIAGNOSIS — R53.83 FATIGUE, UNSPECIFIED TYPE: ICD-10-CM

## 2024-05-31 DIAGNOSIS — G43.109 MIGRAINE WITH AURA AND WITHOUT STATUS MIGRAINOSUS, NOT INTRACTABLE: ICD-10-CM

## 2024-05-31 DIAGNOSIS — Z79.899 HIGH RISK MEDICATION USE: ICD-10-CM

## 2024-05-31 DIAGNOSIS — F41.0 GENERALIZED ANXIETY DISORDER WITH PANIC ATTACKS: ICD-10-CM

## 2024-05-31 DIAGNOSIS — F41.1 GENERALIZED ANXIETY DISORDER WITH PANIC ATTACKS: ICD-10-CM

## 2024-05-31 DIAGNOSIS — J45.20 MILD INTERMITTENT ASTHMA WITHOUT COMPLICATION: ICD-10-CM

## 2024-05-31 DIAGNOSIS — L91.8 CUTANEOUS SKIN TAGS: ICD-10-CM

## 2024-05-31 PROCEDURE — 99395 PREV VISIT EST AGE 18-39: CPT | Performed by: NURSE PRACTITIONER

## 2024-05-31 RX ORDER — ALPRAZOLAM 0.25 MG/1
0.25 TABLET ORAL 2 TIMES DAILY PRN
Qty: 30 TABLET | Refills: 0 | Status: SHIPPED | OUTPATIENT
Start: 2024-05-31

## 2024-05-31 RX ORDER — MONTELUKAST SODIUM 10 MG/1
10 TABLET ORAL NIGHTLY
COMMUNITY
End: 2024-05-31 | Stop reason: SDUPTHER

## 2024-05-31 RX ORDER — RIMEGEPANT SULFATE 75 MG/75MG
75 TABLET, ORALLY DISINTEGRATING ORAL ONCE AS NEEDED
Qty: 8 TABLET | Refills: 3 | Status: SHIPPED | OUTPATIENT
Start: 2024-05-31

## 2024-05-31 RX ORDER — ALPRAZOLAM 0.25 MG/1
0.25 TABLET ORAL 2 TIMES DAILY PRN
COMMUNITY
End: 2024-05-31 | Stop reason: SDUPTHER

## 2024-05-31 RX ORDER — MONTELUKAST SODIUM 10 MG/1
10 TABLET ORAL NIGHTLY
Qty: 90 TABLET | Refills: 2 | Status: SHIPPED | OUTPATIENT
Start: 2024-05-31

## 2024-05-31 NOTE — PROGRESS NOTES
Subjective   Giovanna Lao is a 33 y.o. female.     Chief Complaint   Patient presents with    Annual Exam    Migraine        History of Present Illness   She is here today for CPE.  She is due for lab work.  She is not fasting today. She is feeling ok today.  She tries to maintain a healthy lifestyle with eating and exercise.  She would like to discuss referral to dermatology for multiple skin tags.  She also notes a history of recurrent impetigo on the face.    Asthma/environmental allergies- well controlled on Singulair and Advair.  She is requesting a refill on Singulair today.  She uses albuterol rarely.    KAREN with panic attacks/depression- Patient doing well with current medication regimen, compliant with medication schedule, denies adverse effects.  She notes anxiety and depression are well-controlled on the Lexapro 20 mg daily.  She notes improvement in panic attacks.  She uses alprazolam 0.25 mg as needed for panic attacks with improvement.  She uses this rarely.  She denies any medication side effects.  She is requesting a refill on the alprazolam today.  ADHD- she was recently started on Vyvanse. She notes some improvement in symptoms since starting this.  She had previously tried several different stimulants but did not tolerate these.  She does note some afternoon fatigue.  She is unsure if this is related to coming off the medication.  She is followed by Satinder Gilmore with behavioral health.    Migraine with aura- started as a child. Migraines returned during pregnancy then decreased. She notes over the past 1-2 months an increase in migraine headaches. She has had on average 4 migraine headaches a month occasionally with an aura.  She has tried Excedrin Migraine without improvement and tries to limit caffeine intake secondary to stimulant use.  She notes she was recently started on Vyvanse for ADHD prior to increase in migraine headaches. She is unsure if this is related. She has previously used  Fioricet for migraines during pregnancy with some improvement.  She is up-to-date with eye doctor.    Hemorrhoids-she notes external and internal hemorrhoids that flareup occasionally. These started after pregnancy and have become more bothersome.  She has previously seen a specialist who recommended hemorrhoidectomy.  She is hesitant about this.  She would like to see a new colorectal specialist to discuss treatment options.  She has a history of constipation.  She has recently started taking magnesium with some improvement in bowel habits.  She has previously tried over-the-counter laxatives and stool softeners but these caused too much diarrhea.  She denies any rectal bleeding, BRBPR, melena, abdominal pain.    GYN- she is due to see GYN in November for pap.     The following portions of the patient's history were reviewed and updated as appropriate: allergies, current medications, past family history, past medical history, past social history, past surgical history, and problem list.    Review of Systems   Constitutional:  Positive for fatigue. Negative for chills and fever.   HENT: Negative.     Eyes: Negative.    Respiratory: Negative.     Cardiovascular: Negative.    Gastrointestinal:  Positive for constipation and rectal pain. Negative for abdominal distention, abdominal pain, anal bleeding, blood in stool, diarrhea, nausea, vomiting, GERD and indigestion.   Endocrine: Negative.    Genitourinary: Negative.    Musculoskeletal: Negative.    Skin:  Positive for skin lesions (skin tags).   Allergic/Immunologic: Positive for environmental allergies.   Neurological:  Positive for headache. Negative for dizziness, tremors, seizures, syncope, facial asymmetry, speech difficulty, weakness, light-headedness, numbness, memory problem and confusion.   Hematological: Negative.    Psychiatric/Behavioral: Negative.         Objective   Physical Exam  Constitutional:       Appearance: Normal appearance. She is  well-developed.   HENT:      Head: Normocephalic and atraumatic.      Right Ear: Hearing, tympanic membrane, ear canal and external ear normal.      Left Ear: Hearing, tympanic membrane, ear canal and external ear normal.      Nose: Nose normal.      Right Sinus: No maxillary sinus tenderness or frontal sinus tenderness.      Left Sinus: No maxillary sinus tenderness or frontal sinus tenderness.      Mouth/Throat:      Lips: Pink.      Mouth: Mucous membranes are moist.      Dentition: Normal dentition.      Tongue: No lesions.      Pharynx: Oropharynx is clear. Uvula midline.      Tonsils: No tonsillar exudate.   Eyes:      General: Lids are normal.      Extraocular Movements: Extraocular movements intact.      Conjunctiva/sclera: Conjunctivae normal.      Pupils: Pupils are equal, round, and reactive to light.   Neck:      Thyroid: No thyroid mass, thyromegaly or thyroid tenderness.      Vascular: No carotid bruit.      Trachea: Trachea normal.   Cardiovascular:      Rate and Rhythm: Normal rate and regular rhythm.      Pulses: Normal pulses.           Radial pulses are 2+ on the right side and 2+ on the left side.        Popliteal pulses are 2+ on the right side and 2+ on the left side.        Dorsalis pedis pulses are 2+ on the right side and 2+ on the left side.        Posterior tibial pulses are 2+ on the right side and 2+ on the left side.      Heart sounds: S1 normal and S2 normal.   Pulmonary:      Effort: Pulmonary effort is normal.      Breath sounds: Normal breath sounds.   Abdominal:      General: Bowel sounds are normal. There is no distension or abdominal bruit.      Palpations: Abdomen is soft. There is no hepatomegaly or splenomegaly.      Tenderness: There is no abdominal tenderness.      Hernia: No hernia is present.   Musculoskeletal:      Cervical back: Normal range of motion and neck supple.      Right lower leg: No edema.      Left lower leg: No edema.   Lymphadenopathy:      Head:      Right  side of head: No submental, submandibular, tonsillar or occipital adenopathy.      Left side of head: No submental, submandibular, tonsillar or occipital adenopathy.      Cervical: No cervical adenopathy.      Upper Body:      Right upper body: No supraclavicular adenopathy.      Left upper body: No supraclavicular adenopathy.      Lower Body: No right inguinal adenopathy. No left inguinal adenopathy.   Skin:     General: Skin is warm and dry.      Findings: No rash.      Nails: There is no clubbing.   Neurological:      General: No focal deficit present.      Mental Status: She is alert and oriented to person, place, and time.      Cranial Nerves: Cranial nerves 2-12 are intact.      Sensory: Sensation is intact.      Motor: Motor function is intact.      Coordination: Coordination is intact.      Gait: Gait is intact.      Deep Tendon Reflexes:      Reflex Scores:       Patellar reflexes are 2+ on the right side and 2+ on the left side.  Psychiatric:         Attention and Perception: Attention and perception normal.         Mood and Affect: Mood and affect normal.         Speech: Speech normal.         Behavior: Behavior normal. Behavior is cooperative.         Thought Content: Thought content normal.         Cognition and Memory: Cognition and memory normal.         Judgment: Judgment normal.         Vitals:    05/31/24 1452   BP: 110/72   Pulse: 87   Temp: 98.3 °F (36.8 °C)   SpO2: 98%      Body mass index is 25.69 kg/m².    Assessment & Plan   Problems Addressed this Visit       Mild intermittent asthma without complication    Relevant Medications    montelukast (SINGULAIR) 10 MG tablet    Environmental allergies    Relevant Medications    montelukast (SINGULAIR) 10 MG tablet    Generalized anxiety disorder with panic attacks    Relevant Medications    ALPRAZolam (XANAX) 0.25 MG tablet    Depression    Relevant Medications    ALPRAZolam (XANAX) 0.25 MG tablet    Attention deficit hyperactivity disorder (ADHD),  combined type    Relevant Medications    ALPRAZolam (XANAX) 0.25 MG tablet    High risk medication use    Migraine with aura and without status migrainosus, not intractable    Relevant Medications    Rimegepant Sulfate (Nurtec) 75 MG tablet dispersible tablet     Other Visit Diagnoses       Healthcare maintenance    -  Primary    Hemorrhoids, unspecified hemorrhoid type        Relevant Orders    Ambulatory Referral to Colorectal Surgery    Cutaneous skin tags        Relevant Orders    Ambulatory Referral to Dermatology (Completed)    Impetigo        Relevant Orders    Ambulatory Referral to Dermatology (Completed)          Diagnoses         Codes Comments    Healthcare maintenance    -  Primary ICD-10-CM: Z00.00  ICD-9-CM: V70.0     Hemorrhoids, unspecified hemorrhoid type     ICD-10-CM: K64.9  ICD-9-CM: 455.6     Cutaneous skin tags     ICD-10-CM: L91.8  ICD-9-CM: 701.9     Migraine with aura and without status migrainosus, not intractable     ICD-10-CM: G43.109  ICD-9-CM: 346.00     Mild intermittent asthma without complication     ICD-10-CM: J45.20  ICD-9-CM: 493.90     Environmental allergies     ICD-10-CM: Z91.09  ICD-9-CM: V15.09     Impetigo     ICD-10-CM: L01.00  ICD-9-CM: 684     Generalized anxiety disorder with panic attacks     ICD-10-CM: F41.1, F41.0  ICD-9-CM: 300.02, 300.01     Depression, unspecified depression type     ICD-10-CM: F32.A  ICD-9-CM: 311     Attention deficit hyperactivity disorder (ADHD), combined type     ICD-10-CM: F90.2  ICD-9-CM: 314.01     High risk medication use     ICD-10-CM: Z79.899  ICD-9-CM: V58.69           1.  Preventative counseling-encouraged her to continue healthy, balanced eating and regular exercise.  Ensure adequate dietary to calcium and vitamin D.  2.  Migraine with aura-not controlled.  Recommend continuing magnesium and try addition of riboflavin 400 mg daily.  Will also start Nurtec 75 mg to use once as needed for abortive therapy.  Samples provided to patient  today prescription sent to pharmacy.  Check labs next week.  Follow-up in 8 weeks.  3.  KAREN with panic attacks/depression-stable.  Recommend continuing Lexapro 20 mg daily.  Prescription for alprazolam 0.25 mg twice daily as needed for panic attacks refilled today.  CSA updated today and PDMP reviewed by me today.  Will check UDS next week.  Patient aware of appropriate use and adverse effects of high risk medications including the risk of dependence with prolonged use of benzodiazepines.  She uses these rarely and has significant improvement in acute anxiety with the medication.  Patient elects to continue with current therapy.  4.  Asthma/environmental allergies-well-controlled.  Notified him use albuterol greater than 2 days a week not associate with exercise.  5.  ADHD-managed per behavioral health.  Encouraged her to follow-up as scheduled.  Discussed with her today migraine headache increased frequency may be related to stimulant use.  Recommend watchful waiting to see if this improves.  6.  Cutaneous skin tag/impetigo-referral placed to dermatology.  7.  Hemorrhoids-referral placed to colorectal surgery for further evaluation.  Recommend continuing magnesium supplement along with adequate dietary intake of fiber and fluid intake.  “Discussed risks/benefits to vaccination, reviewed components of the vaccine, discussed VIS, discussed informed consent, informed consent obtained. Patient/Parent was allowed to accept or refuse vaccine. Questions answered to satisfactory state of patient/Parent. We reviewed typical age appropriate and seasonally appropriate vaccinations. Reviewed immunization history and updated state vaccination form as needed. Patient was counseled on Prevnar 20  Tdap    Encouraged routine dental and eye exams.  GYN scheduled November  Encouraged sunscreen use outside    Fasting labs next week, will call with results.  Follow-up in 8 weeks for migraine headaches.

## 2024-06-06 ENCOUNTER — TELEPHONE (OUTPATIENT)
Dept: INTERNAL MEDICINE | Facility: CLINIC | Age: 34
End: 2024-06-06
Payer: OTHER GOVERNMENT

## 2024-06-06 DIAGNOSIS — G43.109 MIGRAINE WITH AURA AND WITHOUT STATUS MIGRAINOSUS, NOT INTRACTABLE: Primary | ICD-10-CM

## 2024-06-06 RX ORDER — RIZATRIPTAN BENZOATE 10 MG/1
10 TABLET, ORALLY DISINTEGRATING ORAL ONCE AS NEEDED
Qty: 9 TABLET | Refills: 1 | Status: SHIPPED | OUTPATIENT
Start: 2024-06-06

## 2024-06-06 NOTE — TELEPHONE ENCOUNTER
[facility] is [facility] because she needs to try and filed 2 months trial of triptans.

## 2024-06-17 DIAGNOSIS — F90.2 ATTENTION DEFICIT HYPERACTIVITY DISORDER (ADHD), COMBINED TYPE: Primary | ICD-10-CM

## 2024-06-17 DIAGNOSIS — Z79.899 HIGH RISK MEDICATION USE: ICD-10-CM

## 2024-06-17 RX ORDER — LISDEXAMFETAMINE DIMESYLATE 30 MG/1
30 CAPSULE ORAL EVERY MORNING
Qty: 30 CAPSULE | Refills: 0 | Status: SHIPPED | OUTPATIENT
Start: 2024-06-18

## 2024-06-20 ENCOUNTER — OFFICE VISIT (OUTPATIENT)
Dept: SURGERY | Facility: CLINIC | Age: 34
End: 2024-06-20
Payer: OTHER GOVERNMENT

## 2024-06-20 VITALS
SYSTOLIC BLOOD PRESSURE: 108 MMHG | HEIGHT: 67 IN | DIASTOLIC BLOOD PRESSURE: 80 MMHG | WEIGHT: 159.2 LBS | BODY MASS INDEX: 24.99 KG/M2

## 2024-06-20 DIAGNOSIS — K64.0 GRADE I HEMORRHOIDS: Primary | ICD-10-CM

## 2024-06-20 NOTE — PROGRESS NOTES
Colorectal & General Surgery  Consultation    Patient: Giovanna Lao  YOB: 1990  MRN: [id number]      Assessment  Giovanna Lao is a 33 y.o. female with residual hemorrhoidal skin tag associated with grade 1 internal and external hemorrhoid in the left anterior position.  This is symptomatic to her.  We discussed the role of conservative management with water and fiber and improving her stooling habits alone versus surgical intervention with hemorrhoidectomy in addition to those conservative measures.  We discussed the risk, benefits, alternatives to surgery as well as the postoperative pain expectations.  Informed consent was obtained.    Plan  Proceed with excisional hemorrhoidectomy    Colonoscopy due: Age 46    Referring Provider: JEAN Valenzuela     Reason for Consultation: Hemorrhoids    History of Present Illness   Giovanna Lao is a 33 y.o. female who presents to the office with complaints of hemorrhoid this been present for about 3 years.  She says that it is sometimes painful and causes her discomfort when she sits on a bicycle seat or when it has externalized.  It seems to become inflamed after a bout of constipation.  She does struggle with constipation and sometimes has only 1 bowel movement a week.    Most recent colonoscopy: Never    Past Medical History   Past Medical History:   Diagnosis Date    Acid reflux     ADHD     Allergic     Anxiety     Asthma     Attention deficit hyperactivity disorder (ADHD), combined type 10/9/2023    Depression     Gastritis     Headache     Irritable bowel syndrome     IBS-C    Mental problems     Neuromuscular disorder     Thoracic Outlet Syndrome    Panic disorder 2017    Infrequent panic attacks    Scoliosis     Mild    Visual impairment         Past Surgical History   Past Surgical History:   Procedure Laterality Date    BONE RESECTION, RIB Left      SECTION      D & C FIRST TRIMESTER / TX INCOMPLETE / MISSED / SEPTIC / INDUCED           Social History  Social History     Socioeconomic History    Marital status:    Tobacco Use    Smoking status: Never    Smokeless tobacco: Never   Vaping Use    Vaping status: Never Used   Substance and Sexual Activity    Alcohol use: Yes     Alcohol/week: 1.0 standard drink of alcohol     Types: 1 Cans of beer per week     Comment: 1-2/week    Drug use: Never    Sexual activity: Yes     Partners: Male     Birth control/protection: Birth control pill     Comment: 91 day pack       Family History  Family History   Problem Relation Age of Onset    Alcohol abuse Mother     Depression Mother     Asthma Mother     Nephrolithiasis Mother     Anxiety disorder Mother         KAREN and Agoraphobia    Suicide Attempts Mother         At least 2    Arthritis Mother     Mental illness Mother     Miscarriages / Stillbirths Mother     Colon polyps Mother     Depression Father     Hypertension Father     Paranoid behavior Father     Mental illness Father     Depression Brother     Bipolar disorder Brother     ADD / ADHD Brother     Mental illness Brother     Glaucoma Maternal Grandmother     No Known Problems Paternal Grandmother     Heart attack Paternal Grandfather     Stroke Paternal Grandfather     Diabetes Paternal Grandfather     Schizophrenia Maternal Uncle     Colon cancer Neg Hx     Cervical cancer Neg Hx     Uterine cancer Neg Hx     Ovarian cancer Neg Hx     Breast cancer Neg Hx     Thyroid cancer Neg Hx        Colorectal cancer family history: None    Review of Systems  Negative except as documented in the HPI.     Allergies  Allergies   Allergen Reactions    Wellbutrin [Bupropion] Other (See Comments)     Worsening depression.       Medications    Current Outpatient Medications:     ALBUTEROL IN, Inhale 1 puff Every 4 (Four) Hours As Needed., Disp: , Rfl:     ALPRAZolam (XANAX) 0.25 MG tablet, Take 1 tablet by mouth 2 (Two) Times a Day As Needed for Anxiety., Disp: 30 tablet, Rfl: 0    escitalopram  (Lexapro) 20 MG tablet, Take 1 tablet by mouth Daily., Disp: 90 tablet, Rfl: 2    fluticasone-salmeterol (Advair HFA) 115-21 MCG/ACT inhaler, Inhale 2 puffs 2 (Two) Times a Day. (Patient taking differently: Inhale 2 puffs Daily.), Disp: 1 each, Rfl: 2    hydrOXYzine (ATARAX) 10 MG tablet, Take 1-2 tablets by mouth At Night As Needed (sleep)., Disp: 60 tablet, Rfl: 2    Jolessa 0.15-0.03 MG per tablet, Take 1 tablet by mouth Daily., Disp: 90 tablet, Rfl: 3    lidocaine (LIDODERM) 5 %, Place 1 patch on the skin as directed by provider Daily. Remove & Discard patch within 12 hours or as directed by MD (Patient taking differently: Place 1 patch on the skin as directed by provider Daily As Needed. Remove & Discard patch within 12 hours or as directed by MD), Disp: 30 each, Rfl: 5    lisdexamfetamine (Vyvanse) 30 MG capsule, Take 1 capsule by mouth Every Morning, Disp: 30 capsule, Rfl: 0    montelukast (SINGULAIR) 10 MG tablet, Take 1 tablet by mouth Every Night., Disp: 90 tablet, Rfl: 2    rizatriptan MLT (Maxalt-MLT) 10 MG disintegrating tablet, Place 1 tablet on the tongue 1 (One) Time As Needed for Migraine. May repeat in 2 hours if needed, Disp: 9 tablet, Rfl: 1    Vital Signs  Vitals:    06/20/24 0804   BP: 108/80        Physical Exam  Constitutional: Resting comfortably, no acute distress  Neck: Supple, trachea midline  Respiratory: No increased work of breathing, Symmetric excursion  Cardiovascular: Well pefursed, no jugular venous distention evident   Abdominal:  Soft, non-tender, non-distended  Skin: Warm, dry, no rash on visualized skin surfaces  Musculoskeletal: Symmetric strength, no obvious gross abnormalities  Psychiatric: Alert and oriented ×3, normal affect   BMI is within normal parameters. No other follow-up for BMI required.  Perianal: External hemorrhoidal skin tag in the left anterior position.  Digital rectal exam: Smooth, no masses, normal tone    DIAGNOSTIC ANOSCOPY  Indication: Hemorrhoids.   Risks, benefits and alternatives were discussed and the patient agreed to the procedure.      Procedure Note: With a lubricated, gloved index finger, I gently performed a 360 degree sweep of the rectum checking for anal sphincter tone, tenderness, nodules, and masses. Following gentle dilation with a digital rectal exam, I inserted the lubricated anoscope with the obturator completely inserted. The obturator was removed after fully inserting the anoscope. The anoscope was slowly withdrawn, and the rectal and anal mucosa examined over 360 degrees.     Findings: Left anterior hemorrhoidal skin tag associated with a small internal and external hemorrhoid.  Otherwise, very mild grade 1 internal hemorrhoids.  Complications: None  Patient tolerated the procedure well.     Laboratory Results  I have personally reviewed CBC with WBC 9, hemoglobin 14, platelets 354.  CMP with creatinine 0.76, albumin 4.4.    Radiology  None to review    Endoscopy  None to review         Alexander Cosby MD  Colorectal & General Surgery  [facility]    [address], [address]  [place], [place], [address]  P: [phone]  F: [phone]

## 2024-06-29 DIAGNOSIS — F41.0 GENERALIZED ANXIETY DISORDER WITH PANIC ATTACKS: ICD-10-CM

## 2024-06-29 DIAGNOSIS — F41.1 GENERALIZED ANXIETY DISORDER WITH PANIC ATTACKS: ICD-10-CM

## 2024-06-29 DIAGNOSIS — F32.A DEPRESSION, UNSPECIFIED DEPRESSION TYPE: ICD-10-CM

## 2024-07-01 RX ORDER — ESCITALOPRAM OXALATE 20 MG/1
20 TABLET ORAL DAILY
Qty: 90 TABLET | Refills: 1 | Status: SHIPPED | OUTPATIENT
Start: 2024-07-01

## 2024-07-15 DIAGNOSIS — Z79.899 HIGH RISK MEDICATION USE: ICD-10-CM

## 2024-07-15 DIAGNOSIS — F90.2 ATTENTION DEFICIT HYPERACTIVITY DISORDER (ADHD), COMBINED TYPE: Primary | ICD-10-CM

## 2024-07-16 RX ORDER — LISDEXAMFETAMINE DIMESYLATE 30 MG/1
30 CAPSULE ORAL EVERY MORNING
Qty: 30 CAPSULE | Refills: 0 | Status: SHIPPED | OUTPATIENT
Start: 2024-07-17

## 2024-08-08 DIAGNOSIS — Z79.899 HIGH RISK MEDICATION USE: Primary | ICD-10-CM

## 2024-08-11 LAB
ACCEPTABLE CREAT UR QL: 356.4 MG/DL (ref 20–300)
AMPHET UR QL CFM: >3000 NG/ML
AMPHET UR QL CFM: POSITIVE
AMPHET+METHAMPHET UR-MCNC: POSITIVE NG/ML
AMPHETAMINES UR QL SCN: ABNORMAL NG/ML
BARBITURATES UR QL SCN: NEGATIVE NG/ML
BENZODIAZ UR QL SCN: NEGATIVE NG/ML
COCAINE UR QL SCN: NEGATIVE NG/ML
METHADONE UR QL SCN: NEGATIVE NG/ML
METHAMPHET UR QL CFM: NEGATIVE
OPIATES UR QL SCN: NEGATIVE NG/ML
OXYCODONE+OXYMORPHONE UR QL SCN: NEGATIVE NG/ML
PCP UR QL SCN: NEGATIVE NG/ML
PH UR: 6 [PH] (ref 4.5–8.9)
PROPOXYPH UR QL SCN: NEGATIVE NG/ML

## 2024-08-16 DIAGNOSIS — F90.2 ATTENTION DEFICIT HYPERACTIVITY DISORDER (ADHD), COMBINED TYPE: ICD-10-CM

## 2024-08-16 DIAGNOSIS — Z79.899 HIGH RISK MEDICATION USE: ICD-10-CM

## 2024-08-16 RX ORDER — LISDEXAMFETAMINE DIMESYLATE 30 MG/1
30 CAPSULE ORAL EVERY MORNING
Qty: 30 CAPSULE | Refills: 0 | Status: SHIPPED | OUTPATIENT
Start: 2024-08-16

## 2024-09-11 ENCOUNTER — TELEPHONE (OUTPATIENT)
Dept: BEHAVIORAL HEALTH | Facility: CLINIC | Age: 34
End: 2024-09-11
Payer: OTHER GOVERNMENT

## 2024-09-11 ENCOUNTER — TELEPHONE (OUTPATIENT)
Dept: FAMILY MEDICINE CLINIC | Facility: CLINIC | Age: 34
End: 2024-09-11
Payer: OTHER GOVERNMENT

## 2024-09-11 NOTE — TELEPHONE ENCOUNTER
Pt called and needs to reschedule appt she has tomorrow with Dillon.  [phone]  
Rescheduled patient's appointment.  
None

## 2024-09-11 NOTE — TELEPHONE ENCOUNTER
Patient called to reschedule appointment for tomorrow with JEAN Barrios. She wanted to discuss discontinuing her Vyvanse due to some side effects including lockjaw. Patient is wondering if she needs to discontinue this medication slowly or if she can discontinue it at any point, will want to discuss new medication options with Satinder at her next follow-up.

## 2024-09-17 DIAGNOSIS — Z79.899 HIGH RISK MEDICATION USE: ICD-10-CM

## 2024-09-17 DIAGNOSIS — F90.2 ATTENTION DEFICIT HYPERACTIVITY DISORDER (ADHD), COMBINED TYPE: ICD-10-CM

## 2024-09-17 RX ORDER — LISDEXAMFETAMINE DIMESYLATE 30 MG/1
30 CAPSULE ORAL EVERY MORNING
Qty: 30 CAPSULE | Refills: 0 | Status: SHIPPED | OUTPATIENT
Start: 2024-09-17

## 2024-09-20 ENCOUNTER — PRE-ADMISSION TESTING (OUTPATIENT)
Dept: PREADMISSION TESTING | Facility: HOSPITAL | Age: 34
End: 2024-09-20
Payer: OTHER GOVERNMENT

## 2024-09-20 VITALS
HEIGHT: 67 IN | HEART RATE: 72 BPM | OXYGEN SATURATION: 99 % | TEMPERATURE: 97.9 F | RESPIRATION RATE: 20 BRPM | BODY MASS INDEX: 23.98 KG/M2 | SYSTOLIC BLOOD PRESSURE: 120 MMHG | DIASTOLIC BLOOD PRESSURE: 81 MMHG | WEIGHT: 152.8 LBS

## 2024-09-20 LAB
ANION GAP SERPL CALCULATED.3IONS-SCNC: 8 MMOL/L (ref 5–15)
BUN SERPL-MCNC: 11 MG/DL (ref 6–20)
BUN/CREAT SERPL: 16.7 (ref 7–25)
CALCIUM SPEC-SCNC: 9.1 MG/DL (ref 8.6–10.5)
CHLORIDE SERPL-SCNC: 100 MMOL/L (ref 98–107)
CO2 SERPL-SCNC: 26 MMOL/L (ref 22–29)
CREAT SERPL-MCNC: 0.66 MG/DL (ref 0.57–1)
DEPRECATED RDW RBC AUTO: 42.5 FL (ref 37–54)
EGFRCR SERPLBLD CKD-EPI 2021: 119 ML/MIN/1.73
ERYTHROCYTE [DISTWIDTH] IN BLOOD BY AUTOMATED COUNT: 12.2 % (ref 12.3–15.4)
GLUCOSE SERPL-MCNC: 93 MG/DL (ref 65–99)
HCG SERPL QL: NEGATIVE
HCT VFR BLD AUTO: 38.9 % (ref 34–46.6)
HGB BLD-MCNC: 12.7 G/DL (ref 12–15.9)
MCH RBC QN AUTO: 30.8 PG (ref 26.6–33)
MCHC RBC AUTO-ENTMCNC: 32.6 G/DL (ref 31.5–35.7)
MCV RBC AUTO: 94.4 FL (ref 79–97)
PLATELET # BLD AUTO: 294 10*3/MM3 (ref 140–450)
PMV BLD AUTO: 8.4 FL (ref 6–12)
POTASSIUM SERPL-SCNC: 3.6 MMOL/L (ref 3.5–5.2)
RBC # BLD AUTO: 4.12 10*6/MM3 (ref 3.77–5.28)
SODIUM SERPL-SCNC: 134 MMOL/L (ref 136–145)
WBC NRBC COR # BLD AUTO: 7.88 10*3/MM3 (ref 3.4–10.8)

## 2024-09-20 PROCEDURE — 84703 CHORIONIC GONADOTROPIN ASSAY: CPT

## 2024-09-20 PROCEDURE — 36415 COLL VENOUS BLD VENIPUNCTURE: CPT

## 2024-09-20 PROCEDURE — 85027 COMPLETE CBC AUTOMATED: CPT

## 2024-09-20 PROCEDURE — 80048 BASIC METABOLIC PNL TOTAL CA: CPT

## 2024-09-20 RX ORDER — FLUTICASONE PROPIONATE 50 MCG
2 SPRAY, SUSPENSION (ML) NASAL EVERY EVENING
COMMUNITY

## 2024-09-20 NOTE — DISCHARGE INSTRUCTIONS
Take the following medications the morning of surgery:    ADVAIR INHALER    If you are on prescription narcotic pain medication to control your pain you may also take that medication the morning of surgery.      General Instructions:     Do not eat solid food after midnight the night before surgery.  Clear liquids day of surgery are allowed but must be stopped at least two hours before your hospital arrival time.       Allowed clear liquids      Water, sodas, and tea or coffee with no cream or milk added.       12 to 20 ounces of a clear liquid that contains carbohydrates is recommended.  If non-diabetic, have Gatorade or Powerade.  If diabetic, have G2 or Powerade Zero.     Do not have liquids red in color.  Do not consume chicken, beef, pork or vegetable broth or bouillon cubes of any variety as they are not considered clear liquids and are not allowed.      Infants may have breast milk up to four hours before surgery.  Infants drinking formula may drink formula up to six hours before surgery.   Patients who avoid smoking, chewing tobacco and alcohol for 4 weeks prior to surgery have a reduced risk of post-operative complications.  Quit smoking as many days before surgery as you can.  Do not smoke, use chewing tobacco or drink alcohol the day of surgery.   If applicable bring your C-PAP/ BI-PAP machine in with you to preop day of surgery.  Bring any papers given to you in the doctor’s office.  Wear clean comfortable clothes.  Do not wear contact lenses, false eyelashes or make-up.  Bring a case for your glasses.   Bring crutches or walker if applicable.  Remove all piercings.  Leave jewelry and any other valuables at home.  Hair extensions with metal clips must be removed prior to surgery.  The Pre-Admission Testing nurse will instruct you to bring medications if unable to obtain an accurate list in Pre-Admission Testing.      Preventing a Surgical Site Infection:  For 2 to 3 days before surgery, avoid shaving  with a razor because the razor can irritate skin and make it easier to develop an infection.    Any areas of open skin can increase the risk of a post-operative wound infection by allowing bacteria to enter and travel throughout the body.  Notify your surgeon if you have any skin wounds / rashes even if it is not near the expected surgical site.  The area will need assessed to determine if surgery should be delayed until it is healed.  The night prior to surgery shower using a fresh bar of anti-bacterial soap (such as Dial) and clean washcloth.  Sleep in a clean bed with clean clothing.  Do not allow pets to sleep with you.  Shower on the morning of surgery using a fresh bar of anti-bacterial soap (such as Dial) and clean washcloth.  Dry with a clean towel and dress in clean clothing.  Ask your surgeon if you will be receiving antibiotics prior to surgery.  Make sure you, your family, and all healthcare providers clean their hands with soap and water or an alcohol based hand  before caring for you or your wound.    Day of surgery:  Your arrival time is approximately two hours before your scheduled surgery time.  Please note if you have an early arrival time the surgery doors do not open before 5:00 AM.  Upon arrival, a Pre-op nurse and Anesthesiologist will review your health history, obtain vital signs, and answer questions you may have.  The only belongings needed at this time will be a list of your home medications and if applicable your C-PAP/BI-PAP machine.  A Pre-op nurse will start an IV and you may receive medication in preparation for surgery, including something to help you relax.     Please be aware that surgery does come with discomfort.  We want to make every effort to control your discomfort so please discuss any uncontrolled symptoms with your nurse.   Your doctor will most likely have prescribed pain medications.      If you are going home after surgery you will receive individualized  written care instructions before being discharged.  A responsible adult must drive you to and from the hospital on the day of your surgery and ideally stay with you through the night.   .  Discharge prescriptions can be filled by the hospital pharmacy during regular pharmacy hours.  If you are having surgery late in the day/evening your prescription may be e-prescribed to your pharmacy.  Please verify your pharmacy hours or chose a 24 hour pharmacy to avoid not having access to your prescription because your pharmacy has closed for the day.    If you are staying overnight following surgery, you will be transported to your hospital room following the recovery period.  [facility] has all private rooms.    If you have any questions please call Pre-Admission Testing at [phone].  Deductibles and co-payments are collected on the day of service. Please be prepared to pay the required co-pay, deductible or deposit on the day of service as defined by your plan.    Call your surgeon immediately if you experience any of the following symptoms:  Sore Throat  Shortness of Breath or difficulty breathing  Cough  Chills  Body soreness or muscle pain  Headache  Fever  New loss of taste or smell  Do not arrive for your surgery ill.  Your procedure will need to be rescheduled to another time.  You will need to call your physician before the day of surgery to avoid any unnecessary exposure to hospital staff as well as other patients.

## 2024-09-24 ENCOUNTER — ANESTHESIA (OUTPATIENT)
Dept: PERIOP | Facility: HOSPITAL | Age: 34
End: 2024-09-24
Payer: OTHER GOVERNMENT

## 2024-09-24 ENCOUNTER — ANESTHESIA EVENT (OUTPATIENT)
Dept: PERIOP | Facility: HOSPITAL | Age: 34
End: 2024-09-24
Payer: OTHER GOVERNMENT

## 2024-09-24 ENCOUNTER — HOSPITAL ENCOUNTER (OUTPATIENT)
Facility: HOSPITAL | Age: 34
Setting detail: HOSPITAL OUTPATIENT SURGERY
Discharge: HOME | End: 2024-09-24
Attending: SURGERY | Admitting: SURGERY
Payer: OTHER GOVERNMENT

## 2024-09-24 VITALS
OXYGEN SATURATION: 96 % | RESPIRATION RATE: 16 BRPM | TEMPERATURE: 98.5 F | SYSTOLIC BLOOD PRESSURE: 117 MMHG | DIASTOLIC BLOOD PRESSURE: 81 MMHG | HEART RATE: 96 BPM

## 2024-09-24 DIAGNOSIS — K64.0 GRADE I HEMORRHOIDS: ICD-10-CM

## 2024-09-24 PROCEDURE — 25010000002 ONDANSETRON PER 1 MG: Performed by: NURSE ANESTHETIST, CERTIFIED REGISTERED

## 2024-09-24 PROCEDURE — 25810000003 LACTATED RINGERS PER 1000 ML: Performed by: STUDENT IN AN ORGANIZED HEALTH CARE EDUCATION/TRAINING PROGRAM

## 2024-09-24 PROCEDURE — S0260 H&P FOR SURGERY: HCPCS | Performed by: SURGERY

## 2024-09-24 PROCEDURE — C9290 INJ, BUPIVACAINE LIPOSOME: HCPCS | Performed by: SURGERY

## 2024-09-24 PROCEDURE — 88304 TISSUE EXAM BY PATHOLOGIST: CPT | Performed by: SURGERY

## 2024-09-24 PROCEDURE — 25010000002 GLYCOPYRROLATE 0.2 MG/ML SOLUTION: Performed by: NURSE ANESTHETIST, CERTIFIED REGISTERED

## 2024-09-24 PROCEDURE — 25010000002 PROPOFOL 10 MG/ML EMULSION: Performed by: NURSE ANESTHETIST, CERTIFIED REGISTERED

## 2024-09-24 PROCEDURE — 0 BUPIVACAINE LIPOSOME 1.3 % SUSPENSION 20 ML VIAL: Performed by: SURGERY

## 2024-09-24 PROCEDURE — 25010000002 DEXAMETHASONE PER 1 MG: Performed by: NURSE ANESTHETIST, CERTIFIED REGISTERED

## 2024-09-24 PROCEDURE — 25010000002 FENTANYL CITRATE (PF) 50 MCG/ML SOLUTION: Performed by: NURSE ANESTHETIST, CERTIFIED REGISTERED

## 2024-09-24 PROCEDURE — 46260 REMOVE IN/EX HEM GROUPS 2+: CPT | Performed by: SURGERY

## 2024-09-24 PROCEDURE — 25010000002 KETOROLAC TROMETHAMINE PER 15 MG: Performed by: NURSE ANESTHETIST, CERTIFIED REGISTERED

## 2024-09-24 RX ORDER — DIAZEPAM 5 MG
5 TABLET ORAL EVERY 8 HOURS PRN
Qty: 15 TABLET | Refills: 0 | Status: SHIPPED | OUTPATIENT
Start: 2024-09-24 | End: 2024-10-08

## 2024-09-24 RX ORDER — MIDAZOLAM HYDROCHLORIDE 1 MG/ML
1 INJECTION INTRAMUSCULAR; INTRAVENOUS
Status: DISCONTINUED | OUTPATIENT
Start: 2024-09-24 | End: 2024-09-24 | Stop reason: HOSPADM

## 2024-09-24 RX ORDER — HYDROMORPHONE HYDROCHLORIDE 1 MG/ML
0.5 INJECTION, SOLUTION INTRAMUSCULAR; INTRAVENOUS; SUBCUTANEOUS
Status: DISCONTINUED | OUTPATIENT
Start: 2024-09-24 | End: 2024-09-24 | Stop reason: HOSPADM

## 2024-09-24 RX ORDER — FENTANYL CITRATE 50 UG/ML
INJECTION, SOLUTION INTRAMUSCULAR; INTRAVENOUS AS NEEDED
Status: DISCONTINUED | OUTPATIENT
Start: 2024-09-24 | End: 2024-09-24 | Stop reason: SURG

## 2024-09-24 RX ORDER — HYDROCODONE BITARTRATE AND ACETAMINOPHEN 5; 325 MG/1; MG/1
1 TABLET ORAL ONCE AS NEEDED
Status: COMPLETED | OUTPATIENT
Start: 2024-09-24 | End: 2024-09-24

## 2024-09-24 RX ORDER — HYDRALAZINE HYDROCHLORIDE 20 MG/ML
5 INJECTION INTRAMUSCULAR; INTRAVENOUS
Status: DISCONTINUED | OUTPATIENT
Start: 2024-09-24 | End: 2024-09-24 | Stop reason: HOSPADM

## 2024-09-24 RX ORDER — MAGNESIUM HYDROXIDE 1200 MG/15ML
LIQUID ORAL AS NEEDED
Status: DISCONTINUED | OUTPATIENT
Start: 2024-09-24 | End: 2024-09-24 | Stop reason: HOSPADM

## 2024-09-24 RX ORDER — DROPERIDOL 2.5 MG/ML
0.62 INJECTION, SOLUTION INTRAMUSCULAR; INTRAVENOUS
Status: DISCONTINUED | OUTPATIENT
Start: 2024-09-24 | End: 2024-09-24 | Stop reason: HOSPADM

## 2024-09-24 RX ORDER — PROMETHAZINE HYDROCHLORIDE 25 MG/1
25 SUPPOSITORY RECTAL ONCE AS NEEDED
Status: DISCONTINUED | OUTPATIENT
Start: 2024-09-24 | End: 2024-09-24 | Stop reason: HOSPADM

## 2024-09-24 RX ORDER — GLYCOPYRROLATE 0.2 MG/ML
INJECTION INTRAMUSCULAR; INTRAVENOUS AS NEEDED
Status: DISCONTINUED | OUTPATIENT
Start: 2024-09-24 | End: 2024-09-24 | Stop reason: SURG

## 2024-09-24 RX ORDER — DIPHENHYDRAMINE HYDROCHLORIDE 50 MG/ML
12.5 INJECTION INTRAMUSCULAR; INTRAVENOUS
Status: DISCONTINUED | OUTPATIENT
Start: 2024-09-24 | End: 2024-09-24 | Stop reason: HOSPADM

## 2024-09-24 RX ORDER — NALOXONE HCL 0.4 MG/ML
0.2 VIAL (ML) INJECTION AS NEEDED
Status: DISCONTINUED | OUTPATIENT
Start: 2024-09-24 | End: 2024-09-24 | Stop reason: HOSPADM

## 2024-09-24 RX ORDER — OXYCODONE AND ACETAMINOPHEN 7.5; 325 MG/1; MG/1
1 TABLET ORAL EVERY 4 HOURS PRN
Status: DISCONTINUED | OUTPATIENT
Start: 2024-09-24 | End: 2024-09-24 | Stop reason: HOSPADM

## 2024-09-24 RX ORDER — FENTANYL CITRATE 50 UG/ML
50 INJECTION, SOLUTION INTRAMUSCULAR; INTRAVENOUS ONCE AS NEEDED
Status: DISCONTINUED | OUTPATIENT
Start: 2024-09-24 | End: 2024-09-24 | Stop reason: HOSPADM

## 2024-09-24 RX ORDER — SODIUM CHLORIDE 0.9 % (FLUSH) 0.9 %
3 SYRINGE (ML) INJECTION EVERY 12 HOURS SCHEDULED
Status: DISCONTINUED | OUTPATIENT
Start: 2024-09-24 | End: 2024-09-24 | Stop reason: HOSPADM

## 2024-09-24 RX ORDER — LABETALOL HYDROCHLORIDE 5 MG/ML
5 INJECTION, SOLUTION INTRAVENOUS
Status: DISCONTINUED | OUTPATIENT
Start: 2024-09-24 | End: 2024-09-24 | Stop reason: HOSPADM

## 2024-09-24 RX ORDER — ONDANSETRON 2 MG/ML
INJECTION INTRAMUSCULAR; INTRAVENOUS AS NEEDED
Status: DISCONTINUED | OUTPATIENT
Start: 2024-09-24 | End: 2024-09-24 | Stop reason: SURG

## 2024-09-24 RX ORDER — IPRATROPIUM BROMIDE AND ALBUTEROL SULFATE 2.5; .5 MG/3ML; MG/3ML
3 SOLUTION RESPIRATORY (INHALATION) ONCE AS NEEDED
Status: DISCONTINUED | OUTPATIENT
Start: 2024-09-24 | End: 2024-09-24 | Stop reason: HOSPADM

## 2024-09-24 RX ORDER — DEXAMETHASONE SODIUM PHOSPHATE 4 MG/ML
INJECTION, SOLUTION INTRA-ARTICULAR; INTRALESIONAL; INTRAMUSCULAR; INTRAVENOUS; SOFT TISSUE AS NEEDED
Status: DISCONTINUED | OUTPATIENT
Start: 2024-09-24 | End: 2024-09-24 | Stop reason: SURG

## 2024-09-24 RX ORDER — ONDANSETRON 2 MG/ML
4 INJECTION INTRAMUSCULAR; INTRAVENOUS ONCE AS NEEDED
Status: COMPLETED | OUTPATIENT
Start: 2024-09-24 | End: 2024-09-24

## 2024-09-24 RX ORDER — KETOROLAC TROMETHAMINE 30 MG/ML
INJECTION, SOLUTION INTRAMUSCULAR; INTRAVENOUS AS NEEDED
Status: DISCONTINUED | OUTPATIENT
Start: 2024-09-24 | End: 2024-09-24 | Stop reason: SURG

## 2024-09-24 RX ORDER — LIDOCAINE HYDROCHLORIDE 10 MG/ML
0.5 INJECTION, SOLUTION INFILTRATION; PERINEURAL ONCE AS NEEDED
Status: DISCONTINUED | OUTPATIENT
Start: 2024-09-24 | End: 2024-09-24 | Stop reason: HOSPADM

## 2024-09-24 RX ORDER — FLUMAZENIL 0.1 MG/ML
0.2 INJECTION INTRAVENOUS AS NEEDED
Status: DISCONTINUED | OUTPATIENT
Start: 2024-09-24 | End: 2024-09-24 | Stop reason: HOSPADM

## 2024-09-24 RX ORDER — LIDOCAINE HYDROCHLORIDE 20 MG/ML
INJECTION, SOLUTION INFILTRATION; PERINEURAL AS NEEDED
Status: DISCONTINUED | OUTPATIENT
Start: 2024-09-24 | End: 2024-09-24 | Stop reason: SURG

## 2024-09-24 RX ORDER — ROCURONIUM BROMIDE 10 MG/ML
INJECTION, SOLUTION INTRAVENOUS AS NEEDED
Status: DISCONTINUED | OUTPATIENT
Start: 2024-09-24 | End: 2024-09-24 | Stop reason: SURG

## 2024-09-24 RX ORDER — PROMETHAZINE HYDROCHLORIDE 25 MG/1
25 TABLET ORAL ONCE AS NEEDED
Status: DISCONTINUED | OUTPATIENT
Start: 2024-09-24 | End: 2024-09-24 | Stop reason: HOSPADM

## 2024-09-24 RX ORDER — OXYCODONE HYDROCHLORIDE 5 MG/1
5 TABLET ORAL EVERY 4 HOURS PRN
Qty: 30 TABLET | Refills: 0 | Status: SHIPPED | OUTPATIENT
Start: 2024-09-24 | End: 2024-10-08

## 2024-09-24 RX ORDER — PROPOFOL 10 MG/ML
VIAL (ML) INTRAVENOUS AS NEEDED
Status: DISCONTINUED | OUTPATIENT
Start: 2024-09-24 | End: 2024-09-24 | Stop reason: SURG

## 2024-09-24 RX ORDER — EPHEDRINE SULFATE 50 MG/ML
5 INJECTION, SOLUTION INTRAVENOUS ONCE AS NEEDED
Status: DISCONTINUED | OUTPATIENT
Start: 2024-09-24 | End: 2024-09-24 | Stop reason: HOSPADM

## 2024-09-24 RX ORDER — FAMOTIDINE 10 MG/ML
20 INJECTION, SOLUTION INTRAVENOUS ONCE
Status: COMPLETED | OUTPATIENT
Start: 2024-09-24 | End: 2024-09-24

## 2024-09-24 RX ORDER — SODIUM CHLORIDE 0.9 % (FLUSH) 0.9 %
3-10 SYRINGE (ML) INJECTION AS NEEDED
Status: DISCONTINUED | OUTPATIENT
Start: 2024-09-24 | End: 2024-09-24 | Stop reason: HOSPADM

## 2024-09-24 RX ORDER — SODIUM CHLORIDE, SODIUM LACTATE, POTASSIUM CHLORIDE, CALCIUM CHLORIDE 600; 310; 30; 20 MG/100ML; MG/100ML; MG/100ML; MG/100ML
9 INJECTION, SOLUTION INTRAVENOUS CONTINUOUS
Status: DISCONTINUED | OUTPATIENT
Start: 2024-09-24 | End: 2024-09-24 | Stop reason: HOSPADM

## 2024-09-24 RX ORDER — FENTANYL CITRATE 50 UG/ML
50 INJECTION, SOLUTION INTRAMUSCULAR; INTRAVENOUS
Status: DISCONTINUED | OUTPATIENT
Start: 2024-09-24 | End: 2024-09-24 | Stop reason: HOSPADM

## 2024-09-24 RX ADMIN — FENTANYL CITRATE 50 MCG: 50 INJECTION, SOLUTION INTRAMUSCULAR; INTRAVENOUS at 11:16

## 2024-09-24 RX ADMIN — FAMOTIDINE 20 MG: 10 INJECTION INTRAVENOUS at 11:02

## 2024-09-24 RX ADMIN — GLYCOPYRROLATE 0.1 MG: 0.2 INJECTION INTRAMUSCULAR; INTRAVENOUS at 11:16

## 2024-09-24 RX ADMIN — ROCURONIUM BROMIDE 40 MG: 10 INJECTION, SOLUTION INTRAVENOUS at 11:16

## 2024-09-24 RX ADMIN — LIDOCAINE HYDROCHLORIDE 50 MG: 20 INJECTION, SOLUTION INFILTRATION; PERINEURAL at 11:16

## 2024-09-24 RX ADMIN — HYDROCODONE BITARTRATE AND ACETAMINOPHEN 1 TABLET: 5; 325 TABLET ORAL at 13:00

## 2024-09-24 RX ADMIN — DEXAMETHASONE SODIUM PHOSPHATE 10 MG: 4 INJECTION, SOLUTION INTRA-ARTICULAR; INTRALESIONAL; INTRAMUSCULAR; INTRAVENOUS; SOFT TISSUE at 11:23

## 2024-09-24 RX ADMIN — FENTANYL CITRATE 50 MCG: 50 INJECTION, SOLUTION INTRAMUSCULAR; INTRAVENOUS at 12:18

## 2024-09-24 RX ADMIN — SODIUM CHLORIDE, POTASSIUM CHLORIDE, SODIUM LACTATE AND CALCIUM CHLORIDE 9 ML/HR: 600; 310; 30; 20 INJECTION, SOLUTION INTRAVENOUS at 10:38

## 2024-09-24 RX ADMIN — PROPOFOL 200 MG: 10 INJECTION, EMULSION INTRAVENOUS at 11:16

## 2024-09-24 RX ADMIN — FENTANYL CITRATE 50 MCG: 50 INJECTION, SOLUTION INTRAMUSCULAR; INTRAVENOUS at 12:37

## 2024-09-24 RX ADMIN — ONDANSETRON 4 MG: 2 INJECTION, SOLUTION INTRAMUSCULAR; INTRAVENOUS at 12:37

## 2024-09-24 RX ADMIN — ONDANSETRON 4 MG: 2 INJECTION INTRAMUSCULAR; INTRAVENOUS at 11:23

## 2024-09-24 RX ADMIN — KETOROLAC TROMETHAMINE 30 MG: 30 INJECTION, SOLUTION INTRAMUSCULAR at 11:35

## 2024-09-25 ENCOUNTER — TELEPHONE (OUTPATIENT)
Dept: FAMILY MEDICINE CLINIC | Facility: CLINIC | Age: 34
End: 2024-09-25
Payer: OTHER GOVERNMENT

## 2024-09-28 LAB
CYTO UR: NORMAL
LAB AP CASE REPORT: NORMAL
PATH REPORT.ADDENDUM SPEC: NORMAL
PATH REPORT.FINAL DX SPEC: NORMAL
PATH REPORT.GROSS SPEC: NORMAL

## 2024-10-02 ENCOUNTER — TELEPHONE (OUTPATIENT)
Dept: SURGERY | Facility: CLINIC | Age: 34
End: 2024-10-02
Payer: OTHER GOVERNMENT

## 2024-10-02 ENCOUNTER — TELEMEDICINE (OUTPATIENT)
Dept: BEHAVIORAL HEALTH | Facility: CLINIC | Age: 34
End: 2024-10-02
Payer: OTHER GOVERNMENT

## 2024-10-02 DIAGNOSIS — Z79.899 HIGH RISK MEDICATION USE: ICD-10-CM

## 2024-10-02 DIAGNOSIS — F41.0 GENERALIZED ANXIETY DISORDER WITH PANIC ATTACKS: ICD-10-CM

## 2024-10-02 DIAGNOSIS — F32.A DEPRESSION, UNSPECIFIED DEPRESSION TYPE: ICD-10-CM

## 2024-10-02 DIAGNOSIS — F41.1 GENERALIZED ANXIETY DISORDER WITH PANIC ATTACKS: ICD-10-CM

## 2024-10-02 DIAGNOSIS — F90.2 ATTENTION DEFICIT HYPERACTIVITY DISORDER (ADHD), COMBINED TYPE: Primary | ICD-10-CM

## 2024-10-02 DIAGNOSIS — T88.7XXA SIDE EFFECT OF MEDICATION: ICD-10-CM

## 2024-10-02 RX ORDER — DEXMETHYLPHENIDATE HYDROCHLORIDE 2.5 MG/1
2.5-5 TABLET ORAL DAILY PRN
Qty: 14 TABLET | Refills: 0 | Status: SHIPPED | OUTPATIENT
Start: 2024-10-02

## 2024-10-02 RX ORDER — ESCITALOPRAM OXALATE 20 MG/1
20 TABLET ORAL EVERY EVENING
Qty: 90 TABLET | Refills: 1 | Status: SHIPPED | OUTPATIENT
Start: 2024-10-02

## 2024-10-02 NOTE — PATIENT INSTRUCTIONS
-Stimulant/ADHD General Instructions:  Patient educated that they must call every month when they have 3 days left of medication to request a refill [phone].  Patient educated that prescribing will not continue unless follow-up appointments are maintained.  Prescriptions can only be sent to a [place] pharmacy, 30-day supply no refills.  Monitor for appetite suppression/weight loss, worsening anxiety, worsening sleep.  Remember to stay hydrated and snack throughout the day to avoid afternoon crash.  Any cardiovascular symptoms including chest pain, shortness of breath, dizziness, lightheadedness, stop medication and go to nearest emergency room or call 911.  Patient has been educated on the risk versus benefit of being prescribed a controlled substance, patient has been educated on additional monitoring that is required including Damián reports, random urine drug screens, in person appt's and pill counts. Controlled substance agreement renewed yearly. Patients are seen regularly while on a controlled substance.  Patient has been educated on nonstimulant options that carry with a much lower risk, and require less monitoring.     GENERAL NEW PATIENT INSTRUCTIONS:    -Please arrive in person or virtually 10-15 minutes prior to appointment to allow for registration/sign in/questionnaires. If you are seen virtually please review after visit summary (AVS)/patient instructions via Kno for a summary of plan of care/changes in treatment plan. If you are having difficulties logging on or accessing Kno please contact my office for assistance.    -The best way to get a hold of Dillon is to call the office at [phone] and ask to leave a message with his medical assistant. Dillon Gilmore is a Psychiatric Mental Health Nurse Practitioner, due to his specialty patient's are not able to message him directly via Kno. Please give my office up to 48 hours to respond to a patient call/question/refill request. Refill  requests will be made during normal office hours only, Monday-Friday 8:00-5:30.      -Dillon is out of the office on Fridays and weekends. Tuesdays and Thursdays are Dillon's in-office days, Mondays and Wednesdays are his telehealth days.  The decision to be seen virtually or in person is up to the discretion of the provider, not all behavioral health problems are appropriate for telehealth.    -Please call the office at [phone] with any worsening of symptoms or onset of intolerable side effects.  -Follow-up appointments must be maintained in order for prescribing to continue.  -Patient has been educated regarding multimodal approach with healthy nutrition, healthy sleep, regular physical activity, social activities, counseling, and medications.   -Please call 911 or go to the nearest ER if you begin to have thoughts of harming yourself or other people.    No show policy:  We understand unexpected circumstances arise; however, anytime you miss your appointment we are unable to provide you appropriate care.  In addition, each appointment missed could have been used to provide care for others.  We ask that you call at least 24 hours in advance to cancel or reschedule an appointment. We would like to take this opportunity to remind you of our policy stating patients who miss THREE or more appointments without cancelling or rescheduling 24 hours in advance of the appointment may be subject to cancellation of any further visits with our clinic and recommendation to seek in-person services/visits. Please call [phone] to reschedule your appointment. If there are reasons that make it difficult for you to keep the appointments, please call and let us know how we can help. Please understand that medication prescribing will not continue without seeing your provider.
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
## 2024-10-02 NOTE — TELEPHONE ENCOUNTER
"Spoke with patient, she stated that she feels like one of her sutures has popped. The pain she has been having is not worse than before the \"pop\" that she heard. She is not having any heavy bleeding or discharge. She states that is is still about the same amount as what she was having right after her procedure. Patient is not taking the oxycodone unless she is in a lot of pain. She is taking tylenol and ibuprofen as needed, which helps. Patient would like to know if this is normal or a cause for concern. Advised that it is normal due to passing hard stools over the weekend. She should call the office if she starts to notice a large amount of discharge or bleeding. Patient voiced understanding and did not have any further questions    "

## 2024-10-02 NOTE — TELEPHONE ENCOUNTER
Patient called yesterday stating she noticed some of her sutures were coming out s/p hemorrhoidectomy 9/24. She also stated that she felt a 'pop' and that she has been experiencing more pain. Left message for patient requesting call back to discuss.

## 2024-10-09 ENCOUNTER — TELEPHONE (OUTPATIENT)
Dept: SURGERY | Facility: CLINIC | Age: 34
End: 2024-10-09
Payer: OTHER GOVERNMENT

## 2024-10-09 NOTE — TELEPHONE ENCOUNTER
"Patient called the triage line stating since surgery patient has been feeling swelling at the surgical site, like if she is experiencing external hemorrhoids, patient wanted to clarify if this was normal or if there were external hemorrhoids and what her weight lifting restrictions is, she would like to know if there is a cream she can apply on the area. Patient described area as irritated, there was some swelling, reddish-skin color, pain level 3/10 and currently doing sitz baths, taking ibuprofen and valium which seem to be helping with her pain, colace once a day, Miralax once a day and \"a little metamucil\", patient stated she did feel like she has not been able to have soft stools. Advised, this sounds like normal healing process, this could take weeks to heal properly due to the type of surgery patient underwent and the surgical site, but external hemorrhoids could reoccur, patient may increase Miralax up to twice a day as well as the stool softener, if the above do not help patient may increase her metamucil intake and add milk of magnesium gradually. Signs of alarm would be excessive bleeding, pain and fever or chills.  "

## 2024-10-10 NOTE — TELEPHONE ENCOUNTER
Patient informed of comments per Dr. Smith, patient verbalized understanding, patient may increase lifting weight and she should listen to her body as to what her limit is.

## 2024-10-15 ENCOUNTER — OFFICE VISIT (OUTPATIENT)
Dept: SURGERY | Facility: CLINIC | Age: 34
End: 2024-10-15
Payer: OTHER GOVERNMENT

## 2024-10-15 VITALS
SYSTOLIC BLOOD PRESSURE: 122 MMHG | BODY MASS INDEX: 24.67 KG/M2 | HEIGHT: 67 IN | WEIGHT: 157.2 LBS | DIASTOLIC BLOOD PRESSURE: 70 MMHG

## 2024-10-15 DIAGNOSIS — K64.0 GRADE I HEMORRHOIDS: Primary | ICD-10-CM

## 2024-10-15 PROCEDURE — 99024 POSTOP FOLLOW-UP VISIT: CPT | Performed by: SURGERY

## 2024-10-15 NOTE — PROGRESS NOTES
Colorectal & General Surgery  Post - Op    Patient: Giovanna Lao  YOB: 1990  MRN: [id number]      Assessment  Giovanna Lao is a 34 y.o. female with history of internal and external hemorrhoids status post excisional hemorrhoidectomy who presents in postoperative follow-up today.  She continues to recover, albeit slowly.  Pain continues to improve on a daily basis.  She is now having better bowel function with the assistance of laxatives.  She does have some inflammatory skin tags that are slowly improving.  I prescribed her topical lidocaine and hydrocortisone to help alleviate those symptoms.  She is welcome to follow-up with me on an as-needed basis.    History of Present Illness   Giovanna Lao is a 34 y.o. female who presents in postoperative follow-up today.  Pain continues to improve.  She has some clear drainage but no further medic easier.  She does feel that there is some swelling at her bottom still.  She is able to have relatively regular bowel function with the assistance of stool softeners.    Vital Signs  Vitals:    10/15/24 1120   BP: 122/70        Physical Exam  Constitutional: Resting comfortably, no acute distress  Neck: Supple, trachea midline  Respiratory: No increased work of breathing, Symmetric excursion  Cardiovascular: Well pefursed, no jugular venous distention evident   Abdominal:  Soft, non-tender, non-distended  Lymphatics: No cervical or suprascapular adenopathy  Skin: Warm, dry, no rash on visualized skin surfaces  Musculoskeletal: Symmetric strength, no obvious gross abnormalities  Psychiatric: Alert and oriented ×3, normal affect   BMI is within normal parameters. No other follow-up for BMI required.    Pathology  I have personally reviewed pathology results with the patient demonstrating benign hemorrhoids    Alexander Cosby MD  Colorectal & General Surgery  [facility]    [address], [address]  [place], [place], [address]  P:  [phone]  F: [phone]

## 2024-10-24 DIAGNOSIS — F90.2 ATTENTION DEFICIT HYPERACTIVITY DISORDER (ADHD), COMBINED TYPE: ICD-10-CM

## 2024-10-24 DIAGNOSIS — Z79.899 HIGH RISK MEDICATION USE: ICD-10-CM

## 2024-10-24 DIAGNOSIS — F90.2 ATTENTION DEFICIT HYPERACTIVITY DISORDER (ADHD), COMBINED TYPE: Primary | ICD-10-CM

## 2024-10-24 RX ORDER — DEXMETHYLPHENIDATE HYDROCHLORIDE 2.5 MG/1
2.5-5 TABLET ORAL DAILY PRN
Qty: 14 TABLET | Refills: 0 | Status: SHIPPED | OUTPATIENT
Start: 2024-10-24

## 2024-10-24 NOTE — TELEPHONE ENCOUNTER
Patient called requesting refill on Dexmethylphenidate 2.5mg ([facility] only gave her a limited amount to make sure it worked for her) asking for a month supply to be called to [facility] [phone].  Patient also stated [facility] found her a new provider and she is seeing them on 11/21/24

## 2024-10-25 DIAGNOSIS — F41.0 GENERALIZED ANXIETY DISORDER WITH PANIC ATTACKS: ICD-10-CM

## 2024-10-25 DIAGNOSIS — Z01.419 ENCOUNTER FOR GYNECOLOGICAL EXAMINATION (GENERAL) (ROUTINE) WITHOUT ABNORMAL FINDINGS: Primary | ICD-10-CM

## 2024-10-25 DIAGNOSIS — F41.1 GENERALIZED ANXIETY DISORDER WITH PANIC ATTACKS: ICD-10-CM

## 2024-10-25 DIAGNOSIS — F32.A DEPRESSION, UNSPECIFIED DEPRESSION TYPE: ICD-10-CM

## 2024-11-08 ENCOUNTER — OFFICE VISIT (OUTPATIENT)
Dept: OBSTETRICS AND GYNECOLOGY | Facility: CLINIC | Age: 34
End: 2024-11-08
Payer: OTHER GOVERNMENT

## 2024-11-08 VITALS
BODY MASS INDEX: 23.86 KG/M2 | WEIGHT: 152 LBS | SYSTOLIC BLOOD PRESSURE: 122 MMHG | HEIGHT: 67 IN | DIASTOLIC BLOOD PRESSURE: 84 MMHG

## 2024-11-08 DIAGNOSIS — R21 RASH: ICD-10-CM

## 2024-11-08 DIAGNOSIS — Z01.419 WOMEN'S ANNUAL ROUTINE GYNECOLOGICAL EXAMINATION: Primary | ICD-10-CM

## 2024-11-08 DIAGNOSIS — Z30.41 ENCOUNTER FOR SURVEILLANCE OF CONTRACEPTIVE PILLS: ICD-10-CM

## 2024-11-08 RX ORDER — LEVONORGESTREL / ETHINYL ESTRADIOL 0.15-0.03
1 KIT ORAL DAILY
Qty: 90 TABLET | Refills: 3 | Status: SHIPPED | OUTPATIENT
Start: 2024-11-08

## 2024-11-08 RX ORDER — KETOCONAZOLE 20 MG/ML
SHAMPOO TOPICAL 2 TIMES WEEKLY
Qty: 120 ML | Refills: 0 | Status: SHIPPED | OUTPATIENT
Start: 2024-11-11

## 2024-11-08 NOTE — PROGRESS NOTES
GYN Annual Exam     Chief Complaint   Patient presents with    Gynecologic Exam     Pt here today for AE       HPI    Giovanna Lao is a 34 y.o. female who presents for annual well woman exam.  She is sexually active. Periods are  every 90 days with JANAY use , lasting 4 days. No intermenstrual bleeding, spotting, or discharge. Performing SBE:occasionally. Denies history of migraine with aura, denies history of DVT, there is no family history of DVT. She is a nonsmoker. Reports itching rash upper abdomen X2 days, she has had in the past and treated with antifungal.     OB History          2    Para   1    Term   1            AB   1    Living   1         SAB        IAB   1    Ectopic        Molar        Multiple        Live Births   1              LMP- unsure  Current contraception: OCP (estrogen/progesterone)  Last Pap-  NIL  History of abnormal Pap smear: no  History of STD-denies  Family history of uterine, colon or ovarian cancer: no  Family history of breast cancer: no  Gardasil Vaccine: completed    Past Medical History:   Diagnosis Date    [facility] 10/2023    Anxiety     Asthma     Depression     Gastritis     GERD (gastroesophageal reflux disease)     Hemorrhoids     History of UTI     Hx of impetigo     Hx of thoracic outlet syndrome     LEFT:  PERMANENT NUMBNESS  LEFT CHEST WALL, OUTSIDE OF HAND    Irritable bowel syndrome     IBS-C    Mental problems     Migraines     Panic disorder 2017    Infrequent panic attacks    Scoliosis     Mild       Past Surgical History:   Procedure Laterality Date    BONE RESECTION, RIB Left     EXTRA RIB:  22 YEARS OLD     SECTION      D & C FIRST TRIMESTER / TX INCOMPLETE / MISSED / SEPTIC / INDUCED       ENDOSCOPY      HEMORRHOIDECTOMY N/A 2024    Procedure: HEMORRHOIDECTOMY;  Surgeon: Asaf Cosby MD;  Location: [facility];  Service: General;  Laterality: N/A;         Current Outpatient Medications:     ALBUTEROL IN,  Inhale 1 puff Every 4 (Four) Hours As Needed., Disp: , Rfl:     ALPRAZolam (XANAX) 0.25 MG tablet, Take 1 tablet by mouth 2 (Two) Times a Day As Needed for Anxiety., Disp: 30 tablet, Rfl: 0    dexmethylphenidate (FOCALIN) 2.5 MG tablet, Take 1-2 tablets by mouth Daily As Needed (S/S of ADHD (take in AM))., Disp: 14 tablet, Rfl: 0    escitalopram (LEXAPRO) 20 MG tablet, Take 1 tablet by mouth Every Evening., Disp: 90 tablet, Rfl: 1    Ferrous Sulfate (IRON PO), Take 1 tablet by mouth Daily., Disp: , Rfl:     fluticasone (FLONASE) 50 MCG/ACT nasal spray, Administer 2 sprays into the nostril(s) as directed by provider Every Evening., Disp: , Rfl:     fluticasone-salmeterol (Advair HFA) 115-21 MCG/ACT inhaler, Inhale 2 puffs 2 (Two) Times a Day., Disp: 1 each, Rfl: 2    hydrOXYzine (ATARAX) 10 MG tablet, Take 1-2 tablets by mouth At Night As Needed (sleep)., Disp: 60 tablet, Rfl: 2    Jolessa 0.15-0.03 MG per tablet, Take 1 tablet by mouth Daily., Disp: 90 tablet, Rfl: 3    lidocaine (LIDODERM) 5 %, Place 1 patch on the skin as directed by provider Daily. Remove & Discard patch within 12 hours or as directed by MD (Patient taking differently: Place 1 patch on the skin as directed by provider Daily As Needed. Remove & Discard patch within 12 hours or as directed by MD), Disp: 30 each, Rfl: 5    montelukast (SINGULAIR) 10 MG tablet, Take 1 tablet by mouth Every Night., Disp: 90 tablet, Rfl: 2    QUERCETIN PO, Take 1 tablet by mouth Daily., Disp: , Rfl:     RIBOFLAVIN PO, Take 1 tablet by mouth Daily., Disp: , Rfl:     rizatriptan MLT (Maxalt-MLT) 10 MG disintegrating tablet, Place 1 tablet on the tongue 1 (One) Time As Needed for Migraine. May repeat in 2 hours if needed, Disp: 9 tablet, Rfl: 1    [START ON 11/11/2024] ketoconazole (NIZORAL) 2 % shampoo, Apply  topically to the appropriate area as directed 2 (Two) Times a Week., Disp: 120 mL, Rfl: 0    Allergies   Allergen Reactions    Wellbutrin [Bupropion] Other (See  "Comments)     Worsening depression.       Social History     Tobacco Use    Smoking status: Never    Smokeless tobacco: Never   Vaping Use    Vaping status: Never Used   Substance Use Topics    Alcohol use: Yes     Alcohol/week: 1.0 standard drink of alcohol     Types: 1 Cans of beer per week     Comment: RARELY    Drug use: Never       Family History   Problem Relation Age of Onset    Alcohol abuse Mother     Depression Mother     Asthma Mother     Nephrolithiasis Mother     Anxiety disorder Mother         KAREN and Agoraphobia    Suicide Attempts Mother         At least 2    Arthritis Mother     Mental illness Mother     Miscarriages / Stillbirths Mother     Colon polyps Mother     Depression Father     Hypertension Father     Paranoid behavior Father     Mental illness Father     Depression Brother     Bipolar disorder Brother     ADD / ADHD Brother     Mental illness Brother     Schizophrenia Maternal Uncle     Glaucoma Maternal Grandmother     No Known Problems Paternal Grandmother     Heart attack Paternal Grandfather     Stroke Paternal Grandfather     Diabetes Paternal Grandfather     Colon cancer Neg Hx     Cervical cancer Neg Hx     Uterine cancer Neg Hx     Ovarian cancer Neg Hx     Breast cancer Neg Hx     Thyroid cancer Neg Hx     Malig Hyperthermia Neg Hx        Review of Systems   Constitutional:  Negative for chills, fatigue and fever.   Gastrointestinal:  Negative for abdominal distention, abdominal pain, nausea and vomiting.   Genitourinary:  Negative for breast discharge, breast lump, breast pain, dysuria, frequency, menstrual problem, pelvic pain, pelvic pressure, urgency, vaginal bleeding, vaginal discharge and vaginal pain.   Musculoskeletal:  Negative for gait problem.   Skin:  Positive for rash.   Neurological:  Negative for dizziness and headache.   Psychiatric/Behavioral:  Negative for behavioral problems.        /84   Ht 170.2 cm (67\")   Wt 68.9 kg (152 lb)   BMI 23.81 kg/m² "     Physical Exam  Constitutional:       General: She is not in acute distress.     Appearance: Normal appearance. She is not ill-appearing, toxic-appearing or diaphoretic.   Genitourinary:      Vulva, bladder and urethral meatus normal.      No lesions in the vagina.      Right Labia: No rash, tenderness, lesions, skin changes or Bartholin's cyst.     Left Labia: No tenderness, lesions, skin changes, Bartholin's cyst or rash.     No labial fusion noted.      No inguinal adenopathy present in the right or left side.     No vaginal discharge, erythema, tenderness, bleeding or ulceration.      No vaginal prolapse present.     No vaginal atrophy present.       Right Adnexa: not tender, not full, not palpable, no mass present and not absent.     Left Adnexa: not tender, not full, not palpable, no mass present and not absent.     No cervical motion tenderness, discharge, friability, lesion, polyp, nabothian cyst or eversion.      Uterus is not enlarged, fixed, tender, irregular or prolapsed.      No uterine mass detected.     No urethral tenderness or mass present.      Pelvic exam was performed with patient in the lithotomy position.   Breasts:     Breasts are symmetrical.      Right: Present. No swelling, bleeding, inverted nipple, mass, nipple discharge, skin change, tenderness or breast implant.      Left: Present. No swelling, bleeding, inverted nipple, mass, nipple discharge, skin change, tenderness or breast implant.   HENT:      Head: Normocephalic and atraumatic.   Eyes:      Pupils: Pupils are equal, round, and reactive to light.   Cardiovascular:      Rate and Rhythm: Normal rate.   Pulmonary:      Effort: Pulmonary effort is normal.   Abdominal:      General: There is no distension.      Palpations: Abdomen is soft. There is no mass.      Tenderness: There is no abdominal tenderness. There is no guarding.      Hernia: No hernia is present. There is no hernia in the left inguinal area or right inguinal area.    Musculoskeletal:         General: Normal range of motion.      Cervical back: Normal range of motion and neck supple. No tenderness.   Lymphadenopathy:      Cervical: No cervical adenopathy.      Upper Body:      Right upper body: No supraclavicular, axillary or pectoral adenopathy.      Left upper body: No supraclavicular, axillary or pectoral adenopathy.      Lower Body: No right inguinal adenopathy. No left inguinal adenopathy.   Neurological:      General: No focal deficit present.      Mental Status: She is alert and oriented to person, place, and time.      Cranial Nerves: No cranial nerve deficit.   Skin:     General: Skin is warm and dry.      Comments: 2 <1cm flat brown macules noted on upper abdomen   Psychiatric:         Mood and Affect: Mood normal.         Behavior: Behavior normal.         Thought Content: Thought content normal.         Judgment: Judgment normal.   Vitals and nursing note reviewed.       Assessment   Diagnoses and all orders for this visit:    1. Women's annual routine gynecological examination (Primary)  -     IGP, Apt HPV,rfx 16 / 18,45    2. Encounter for surveillance of contraceptive pills  -     Jolessa 0.15-0.03 MG per tablet; Take 1 tablet by mouth Daily.  Dispense: 90 tablet; Refill: 3    3. Rash  -     ketoconazole (NIZORAL) 2 % shampoo; Apply  topically to the appropriate area as directed 2 (Two) Times a Week.  Dispense: 120 mL; Refill: 0       Plan   Well woman exam: Pap smear collected. Recommend MVI daily.    Contraception: JANAY refilled  STD: Enc condoms. Desires STD screen today- No.   Smoking status: nonsmoker   Encouraged annual mammogram screening starting at age 40. Instructed on how to perform SBE. Encouraged breast health self awareness.  6.    Encouraged 150 minutes of exercise per week if not medially contraindicated.   7.    BMI is within normal parameters. No other follow-up for BMI required.  8. Rash: 2 <1cm flat brown macules noted on upper abdomen. Suspect  fungal in nature. Ketoconazole soap to pharmacy    Return in about 1 year (around 11/8/2025) for Annual physical, JEAN Johnston.    JEAN Andrade  11/8/2024  14:01 EST

## 2024-11-11 DIAGNOSIS — Z79.899 HIGH RISK MEDICATION USE: ICD-10-CM

## 2024-11-11 DIAGNOSIS — F90.2 ATTENTION DEFICIT HYPERACTIVITY DISORDER (ADHD), COMBINED TYPE: ICD-10-CM

## 2024-11-11 RX ORDER — DEXMETHYLPHENIDATE HYDROCHLORIDE 2.5 MG/1
2.5-5 TABLET ORAL DAILY PRN
Qty: 14 TABLET | Refills: 0 | Status: SHIPPED | OUTPATIENT
Start: 2024-11-11

## 2024-11-15 LAB
CYTOLOGIST CVX/VAG CYTO: NORMAL
CYTOLOGY CVX/VAG DOC CYTO: NORMAL
CYTOLOGY CVX/VAG DOC THIN PREP: NORMAL
DX ICD CODE: NORMAL
HPV I/H RISK 4 DNA CVX QL PROBE+SIG AMP: NEGATIVE
Lab: NORMAL
OTHER STN SPEC: NORMAL
STAT OF ADQ CVX/VAG CYTO-IMP: NORMAL

## 2024-12-11 DIAGNOSIS — Z30.41 ENCOUNTER FOR SURVEILLANCE OF CONTRACEPTIVE PILLS: ICD-10-CM

## 2024-12-11 RX ORDER — LEVONORGESTREL AND ETHINYL ESTRADIOL 0.15-0.03
1 KIT ORAL DAILY
Qty: 91 TABLET | OUTPATIENT
Start: 2024-12-11

## 2024-12-16 DIAGNOSIS — Z91.09 ENVIRONMENTAL ALLERGIES: ICD-10-CM

## 2024-12-16 DIAGNOSIS — J45.20 MILD INTERMITTENT ASTHMA WITHOUT COMPLICATION: ICD-10-CM

## 2024-12-16 RX ORDER — MONTELUKAST SODIUM 10 MG/1
10 TABLET ORAL NIGHTLY
Qty: 90 TABLET | Refills: 2 | Status: SHIPPED | OUTPATIENT
Start: 2024-12-16

## 2025-02-06 ENCOUNTER — TELEPHONE (OUTPATIENT)
Dept: INTERNAL MEDICINE | Facility: CLINIC | Age: 35
End: 2025-02-06
Payer: OTHER GOVERNMENT

## 2025-02-06 DIAGNOSIS — G47.00 INSOMNIA, UNSPECIFIED TYPE: Primary | ICD-10-CM

## 2025-02-13 ENCOUNTER — OFFICE VISIT (OUTPATIENT)
Dept: INTERNAL MEDICINE | Facility: CLINIC | Age: 35
End: 2025-02-13
Payer: OTHER GOVERNMENT

## 2025-02-13 VITALS
BODY MASS INDEX: 24.77 KG/M2 | SYSTOLIC BLOOD PRESSURE: 102 MMHG | HEART RATE: 79 BPM | WEIGHT: 157.8 LBS | DIASTOLIC BLOOD PRESSURE: 60 MMHG | OXYGEN SATURATION: 98 % | TEMPERATURE: 98.2 F | HEIGHT: 67 IN

## 2025-02-13 DIAGNOSIS — M54.2 CHRONIC NECK PAIN: ICD-10-CM

## 2025-02-13 DIAGNOSIS — G89.29 CHRONIC BILATERAL LOW BACK PAIN WITHOUT SCIATICA: Primary | ICD-10-CM

## 2025-02-13 DIAGNOSIS — M54.50 CHRONIC BILATERAL LOW BACK PAIN WITHOUT SCIATICA: Primary | ICD-10-CM

## 2025-02-13 DIAGNOSIS — G89.29 CHRONIC MIDLINE THORACIC BACK PAIN: ICD-10-CM

## 2025-02-13 DIAGNOSIS — M54.6 CHRONIC MIDLINE THORACIC BACK PAIN: ICD-10-CM

## 2025-02-13 DIAGNOSIS — G89.29 CHRONIC NECK PAIN: ICD-10-CM

## 2025-02-13 PROCEDURE — 99213 OFFICE O/P EST LOW 20 MIN: CPT | Performed by: NURSE PRACTITIONER

## 2025-02-13 RX ORDER — CELECOXIB 100 MG/1
100 CAPSULE ORAL 2 TIMES DAILY PRN
Qty: 60 CAPSULE | Refills: 3 | Status: SHIPPED | OUTPATIENT
Start: 2025-02-13

## 2025-02-13 NOTE — PROGRESS NOTES
Subjective   Giovanna Lao is a 34 y.o. female.     Chief Complaint   Patient presents with    Back Pain     Pt c/o chronic LBP X years.        History of Present Illness   She is here today to discuss low back pain.  She notes chronic bilateral low back pain for years since she was a teenager. She denies any acute trauma. She notes a hx of scoliosis. She notes pain is intermittent, will occasionally flare up for 2-3 days, making it difficult to move. She notes pain occurs with prolonged sitting, laying on her back. She notes occasional middle thoracic back pain and neck pain.  She feels like this became worse after pregnancy.  She is also spoken to her gynecologist about possible referral to plastic surgery to discuss breast reduction as she feels this has contributed to symptoms.  She is currently a triple D.  She denies any radicular symptoms, extremity weakness, paresthesias, saddle anesthesia, bowel or bladder dysfunction.  She will use Tylenol, ibuprofen, lidocaine patches and has been doing home PT exercises with minimal improvement.  She has previously undergone CT lumbar spine in 2020 showing mild disc bulge at L2-3, L4-5, L5-S1.    The following portions of the patient's history were reviewed and updated as appropriate: allergies, current medications, past family history, past medical history, past social history, past surgical history, and problem list.    Review of Systems   Constitutional: Negative.    Respiratory: Negative.     Cardiovascular: Negative.    Musculoskeletal:  Positive for back pain and neck pain.   Neurological:  Negative for weakness and numbness.       Objective   Physical Exam  Constitutional:       Appearance: She is well-developed.   Neck:      Thyroid: No thyroid mass, thyromegaly or thyroid tenderness.      Vascular: No carotid bruit.      Trachea: Trachea normal.   Cardiovascular:      Rate and Rhythm: Normal rate and regular rhythm.      Chest Wall: PMI is not displaced.       Pulses:           Radial pulses are 2+ on the right side and 2+ on the left side.        Dorsalis pedis pulses are 2+ on the right side and 2+ on the left side.        Posterior tibial pulses are 2+ on the right side and 2+ on the left side.      Heart sounds: S1 normal and S2 normal.   Pulmonary:      Effort: Pulmonary effort is normal.      Breath sounds: Normal breath sounds.   Musculoskeletal:      Cervical back: Tenderness present. No swelling, edema, deformity, erythema, signs of trauma, lacerations, rigidity, spasms, torticollis, bony tenderness or crepitus. Pain with movement present. Normal range of motion.      Thoracic back: Tenderness present. No swelling, edema, deformity, signs of trauma, lacerations, spasms or bony tenderness. Normal range of motion. No scoliosis.      Lumbar back: Tenderness present. No swelling, edema, deformity, signs of trauma, lacerations, spasms or bony tenderness. Normal range of motion. Negative right straight leg raise test and negative left straight leg raise test. No scoliosis.      Right lower leg: No edema.      Left lower leg: No edema.      Comments: Lower extremity strength 5 out of 5 symmetric and equal bilaterally.   Lymphadenopathy:      Head:      Right side of head: No submental, submandibular, tonsillar or occipital adenopathy.      Left side of head: No submental, submandibular, tonsillar or occipital adenopathy.      Cervical: No cervical adenopathy.   Skin:     General: Skin is warm and dry.      Capillary Refill: Capillary refill takes less than 2 seconds.      Nails: There is no clubbing.   Neurological:      Mental Status: She is alert and oriented to person, place, and time.      Deep Tendon Reflexes:      Reflex Scores:       Patellar reflexes are 2+ on the right side and 2+ on the left side.  Psychiatric:         Attention and Perception: Attention normal.         Mood and Affect: Mood and affect normal.         Speech: Speech normal.         Behavior:  Behavior normal.         Thought Content: Thought content normal.         Cognition and Memory: Cognition normal.         Vitals:    02/13/25 0813   BP: 102/60   Pulse: 79   Temp: 98.2 °F (36.8 °C)   SpO2: 98%      Body mass index is 24.71 kg/m².    Assessment & Plan   Problems Addressed this Visit       Chronic bilateral low back pain - Primary    Relevant Medications    celecoxib (CeleBREX) 100 MG capsule    Other Relevant Orders    MRI Lumbar Spine Without Contrast     Other Visit Diagnoses       Chronic neck pain        Relevant Medications    celecoxib (CeleBREX) 100 MG capsule    Other Relevant Orders    Ambulatory Referral to Physical Therapy for Evaluation & Treatment (Completed)    Chronic midline thoracic back pain        Relevant Medications    celecoxib (CeleBREX) 100 MG capsule    Other Relevant Orders    Ambulatory Referral to Physical Therapy for Evaluation & Treatment (Completed)          Diagnoses         Codes Comments    Chronic bilateral low back pain without sciatica    -  Primary ICD-10-CM: M54.50, G89.29  ICD-9-CM: 724.2, 338.29     Chronic neck pain     ICD-10-CM: M54.2, G89.29  ICD-9-CM: 723.1, 338.29     Chronic midline thoracic back pain     ICD-10-CM: M54.6, G89.29  ICD-9-CM: 724.1, 338.29           1.  Chronic bilateral low back pain-check MRI lumbar spine for further evaluation.  Will start Celebrex 100 mg twice daily as needed.  No other NSAIDs with this, Tylenol is okay to use.  Pending MRI results patient may benefit from referral to pain management to discuss other interventions.  2.  Chronic neck pain/chronic midline thoracic back pain-start Celebrex 100 mg twice daily as needed.  No other NSAIDs with this, Tylenol is okay to use.  Referral placed to physical therapy for evaluation and treatment.

## 2025-02-24 DIAGNOSIS — M54.50 CHRONIC BILATERAL LOW BACK PAIN WITHOUT SCIATICA: ICD-10-CM

## 2025-02-24 DIAGNOSIS — G89.29 CHRONIC BILATERAL LOW BACK PAIN WITHOUT SCIATICA: ICD-10-CM

## 2025-02-24 DIAGNOSIS — G89.29 CHRONIC BILATERAL LOW BACK PAIN WITHOUT SCIATICA: Primary | ICD-10-CM

## 2025-02-24 DIAGNOSIS — M54.50 CHRONIC BILATERAL LOW BACK PAIN WITHOUT SCIATICA: Primary | ICD-10-CM

## 2025-02-26 ENCOUNTER — OFFICE VISIT (OUTPATIENT)
Dept: SLEEP MEDICINE | Facility: HOSPITAL | Age: 35
End: 2025-02-26
Payer: OTHER GOVERNMENT

## 2025-02-26 VITALS — BODY MASS INDEX: 24.96 KG/M2 | WEIGHT: 159 LBS | HEIGHT: 67 IN | OXYGEN SATURATION: 97 % | HEART RATE: 94 BPM

## 2025-02-26 DIAGNOSIS — G47.10 HYPERSOMNIA WITH SLEEP APNEA: Primary | ICD-10-CM

## 2025-02-26 DIAGNOSIS — G47.30 HYPERSOMNIA WITH SLEEP APNEA: Primary | ICD-10-CM

## 2025-02-26 PROCEDURE — 99244 OFF/OP CNSLTJ NEW/EST MOD 40: CPT | Performed by: INTERNAL MEDICINE

## 2025-02-26 PROCEDURE — G0463 HOSPITAL OUTPT CLINIC VISIT: HCPCS

## 2025-02-26 NOTE — PROGRESS NOTES
[facility]  Sleep Disorders Center New Patient/Consultation       Reason for Consultation: OMAR    Patient Care Team:  Chanda Gomez APRN as PCP - General (Nurse Practitioner)  Mian Foster MD as Consulting Physician (Sleep Medicine)    Chief complaint: Insomnia    History of present illness:    Thank you for asking me to see your patient.  The patient is a 34 y.o. female who reports insomnia and night terrors since age 18 or 19.  She states her dreams are graphic and horrible.  She awakens with recollection of her dreams.  She is scared and panicked, briefly.  These occur about 1 out of 3 nights.  Therefore, I described that she most likely is having nightmares versus night terrors.  People normally do not have recollection of night terror events.    Additionally, she states she has felt unrested and fatigued throughout the day for the last 2 years.  The patient does have a 3-year-old child.    The patient goes to bed between 10 and 11 PM and gets out of bed at 7 AM.  She states he could take her up to 3 hours to fall asleep.  She feels very unrested upon arising.  Occasionally she will take a nap.  On weekends she will go to bed between midnight and 1 AM and still get out of bed at 7 AM.  She states she cannot sleep in.  Occasionally she will take a nap.  She has complaints of hypersomnolence but her [place] Sleepiness Scale is 5.  She has gained 10-15 pounds postpartum.  Sometimes she snores.  She has morning headaches.  These are worse when she has sinus problems.  Sometimes she has nocturnal pain.  She has problems falling asleep with frequent awakenings.  It is infrequent if she uses the restroom and if she does its only once.  Her sleep is restless and she is still sleepy even if she increases her sleep time.    Review of Systems:    A complete review of systems was done and all were negative with the exception of frequent urination, occasional ear pain, some nasal congestion, neck  pain, fatigue, shortness of breath at times with activity, abdominal bloating, and some anxiety and depression    History:  Past Medical History:   Diagnosis Date    ADHD 10/2023    Anxiety     Asthma     Depression     Gastritis     GERD (gastroesophageal reflux disease)     Hemorrhoids     History of UTI     Hx of impetigo     Hx of thoracic outlet syndrome     LEFT:  PERMANENT NUMBNESS  LEFT CHEST WALL, OUTSIDE OF HAND    Irritable bowel syndrome     IBS-C    Mental problems     Migraines     Panic disorder 2017    Infrequent panic attacks    Scoliosis     Mild   ,   Past Surgical History:   Procedure Laterality Date    BONE RESECTION, RIB Left     EXTRA RIB:  22 YEARS OLD     SECTION      D & C FIRST TRIMESTER / TX INCOMPLETE / MISSED / SEPTIC / INDUCED       ENDOSCOPY      HEMORRHOIDECTOMY N/A 2024    Procedure: HEMORRHOIDECTOMY;  Surgeon: Asaf Cosby MD;  Location: [facility] MAIN OR;  Service: General;  Laterality: N/A;   ,   Family History   Problem Relation Age of Onset    Sleep apnea Mother     Alcohol abuse Mother     Depression Mother     Asthma Mother     Nephrolithiasis Mother     Anxiety disorder Mother         KAREN and Agoraphobia    Suicide Attempts Mother         At least 2    Arthritis Mother     Mental illness Mother     Miscarriages / Stillbirths Mother     Colon polyps Mother     Depression Father     Hypertension Father     Paranoid behavior Father     Mental illness Father     Depression Brother     Bipolar disorder Brother     ADD / ADHD Brother     Mental illness Brother     Schizophrenia Maternal Uncle     Glaucoma Maternal Grandmother     No Known Problems Paternal Grandmother     Heart attack Paternal Grandfather     Stroke Paternal Grandfather     Diabetes Paternal Grandfather     Colon cancer Neg Hx     Cervical cancer Neg Hx     Uterine cancer Neg Hx     Ovarian cancer Neg Hx     Breast cancer Neg Hx     Thyroid cancer Neg Hx     Malig Hyperthermia Neg Hx   "  , and   Social History     Socioeconomic History    Marital status:    Tobacco Use    Smoking status: Never    Smokeless tobacco: Never   Vaping Use    Vaping status: Never Used   Substance and Sexual Activity    Alcohol use: Yes     Alcohol/week: 1.0 standard drink of alcohol     Types: 1 Cans of beer per week    Drug use: Never    Sexual activity: Yes     Partners: Male     Birth control/protection: Birth control pill     Comment: 91 day pack     E-cigarette/Vaping    E-cigarette/Vaping Use Never User      E-cigarette/Vaping Substances     E-cigarette/Vaping Devices      Social History: 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  She will have 1 or 2 caffeinated beverages a day.    Allergies:  Wellbutrin [bupropion]     Medication Review: Her list was reviewed.    Vital Signs:    Vitals:    02/26/25 0919   Pulse: 94   SpO2: 97%   Weight: 72.1 kg (159 lb)   Height: 170.2 cm (67\")      Body mass index is 24.9 kg/m².  Neck Circumference: 14 inches      Physical Exam:    Constitutional:  Well developed 34 y.o. female that appears in no apparent distress.  Awake & oriented times 3.  Normal mood with normal recent and remote memory and normal judgement.  Eyes:  Conjunctivae normal.  Oropharynx: Moist mucous membranes without exudate and a normal-sized tongue and uvula and patent posterior pharyngeal opening and class II Mallampati airway.    Neck: Trachea midline  Respiratory: Effort is not labored  Cardiovascular: Radial pulse regular  Musculoskeletal: Gait appears normal, no digital clubbing evident, no pre-tibial edema    Impression:   The patient has complaints of hypersomnolence as well as complaints of snoring, sometimes, with reported restless sleep consistent with sleep disordered breathing.  Rule out obstructive sleep apnea.  Circadian rhythm sleep disorder, delayed sleep phase type    Plan:  Good sleep hygiene measures should be maintained.      Pathophysiology of OMAR described to the " patient.  Cardiovascular complications of untreated OMAR also reviewed.  I described how untreated OMAR can contribute to sleep maintenance insomnia.    After reviewing all with the patient, I would recommend and she is agreeable to proceed with a home sleep study.  I described the procedure to her and I answered all of her questions.  Home sleep study will be scheduled and once those results are known, further recommendations will be made.    Additionally, also reviewed hypersomnolence in general.  The patient's symptoms have only been present for the last 2 years.  I doubt if narcolepsy is playing a role in this patient's differential presently.    Thank you for requesting me to assist in this patient's care.    Mian Foster MD  Sleep Medicine  03/02/25  12:05 EST

## 2025-03-02 PROBLEM — G47.10 HYPERSOMNIA WITH SLEEP APNEA: Status: ACTIVE | Noted: 2025-03-02

## 2025-03-02 PROBLEM — G47.30 HYPERSOMNIA WITH SLEEP APNEA: Status: ACTIVE | Noted: 2025-03-02

## 2025-03-06 ENCOUNTER — HOSPITAL ENCOUNTER (OUTPATIENT)
Dept: SLEEP MEDICINE | Facility: HOSPITAL | Age: 35
Discharge: HOME | End: 2025-03-06
Admitting: INTERNAL MEDICINE
Payer: OTHER GOVERNMENT

## 2025-03-06 DIAGNOSIS — G47.10 HYPERSOMNIA WITH SLEEP APNEA: ICD-10-CM

## 2025-03-06 DIAGNOSIS — G47.30 HYPERSOMNIA WITH SLEEP APNEA: ICD-10-CM

## 2025-03-06 PROCEDURE — G0399 HOME SLEEP TEST/TYPE 3 PORTA: HCPCS

## 2025-03-08 PROCEDURE — 95806 SLEEP STUDY UNATT&RESP EFFT: CPT | Performed by: INTERNAL MEDICINE

## 2025-03-10 ENCOUNTER — TELEPHONE (OUTPATIENT)
Dept: SLEEP MEDICINE | Facility: HOSPITAL | Age: 35
End: 2025-03-10
Payer: OTHER GOVERNMENT

## 2025-03-19 NOTE — PROGRESS NOTES
The patient has a pain history of the following:  Chronic low back pain  History of thoracic outlet syndrome    Previous interventions that the patient has received include:   None     Pain medications include:  Celebrex  Lidocaine patch    Previously: Flexeril (sleepiness)    Other conservative modalities which the patient reports using include:  Physical Therapy: yes  Chiropractor: no  Massage Therapy: no  TENS: yes  Neck or back surgery: yes  Past pain management: no  Ice  Heat  Dry needling     Past Significant Surgical History:  Rib resection 2012     HPI:       CHIEF COMPLAINT: Back Pain    Giovanna Lao is a 34 y.o. female referred here by JEAN Valenzuela. Giovanna Lao presents to the office for evaluation and treatment of Back Pain      History of Present Illness  Onset:  Since highschool   Inciting Event:  Nothing in particular   Location:  Upper/Mid/low back  Pain: Pain described as throbbing and a feeling of being stuck. Located over her s pine and does not radiate into her arms or legs.  Severity:  Pain rated as a 0 /10.  Apportions pain as 100%  back pain and 0% extremity pain.  Symptoms have been episodic.  Exacerbation:  Standing for long periods of time, lying in bed at night.   Alleviation:  Heating pad, TENS, Ibuprofen, wearing comfortable shoes.  Associated Symptoms:   She denies any new onset of bowel or bladder weakness, significant leg weakness or saddle anesthesia. Denies balance problems or lower extremity incoordination.  Ambulates: Without assistive device   Limitations: This pain limits the patient from standing in the kitchen to cook without pain, working from home without pain, sleeping more soundly through the night.   Goals: Functional goals include ability to do the above.     [place] 14        PEG Assessment   What number best describes your pain on average in the past week?2  What number best describes how, during the past week, pain has interfered with your enjoyment  of life?0  What number best describes how, during the past week, pain has interfered with your general activity?  0        Current Outpatient Medications:     ALBUTEROL IN, Inhale 1 puff Every 4 (Four) Hours As Needed., Disp: , Rfl:     ALPRAZolam (XANAX) 0.25 MG tablet, Take 1 tablet by mouth 2 (Two) Times a Day As Needed for Anxiety., Disp: 30 tablet, Rfl: 0    celecoxib (CeleBREX) 100 MG capsule, Take 1 capsule by mouth 2 (Two) Times a Day As Needed for Mild Pain., Disp: 60 capsule, Rfl: 3    escitalopram (LEXAPRO) 20 MG tablet, Take 1 tablet by mouth Every Evening., Disp: 90 tablet, Rfl: 1    Ferrous Sulfate (IRON PO), Take 1 tablet by mouth Daily., Disp: , Rfl:     fluticasone (FLONASE) 50 MCG/ACT nasal spray, Administer 2 sprays into the nostril(s) as directed by provider Every Evening., Disp: , Rfl:     fluticasone-salmeterol (Advair HFA) 115-21 MCG/ACT inhaler, Inhale 2 puffs 2 (Two) Times a Day., Disp: 1 each, Rfl: 2    Jolessa 0.15-0.03 MG per tablet, Take 1 tablet by mouth Daily., Disp: 90 tablet, Rfl: 3    ketoconazole (NIZORAL) 2 % shampoo, Apply  topically to the appropriate area as directed 2 (Two) Times a Week., Disp: 120 mL, Rfl: 0    lidocaine (LIDODERM) 5 %, Place 1 patch on the skin as directed by provider Daily. Remove & Discard patch within 12 hours or as directed by MD (Patient taking differently: Place 1 patch on the skin as directed by provider Daily As Needed. Remove & Discard patch within 12 hours or as directed by MD), Disp: 30 each, Rfl: 5    montelukast (SINGULAIR) 10 MG tablet, TAKE 1 TABLET BY MOUTH EVERY NIGHT, Disp: 90 tablet, Rfl: 2    QUERCETIN PO, Take 1 tablet by mouth Daily., Disp: , Rfl:     RIBOFLAVIN PO, Take 1 tablet by mouth Daily., Disp: , Rfl:     rizatriptan MLT (Maxalt-MLT) 10 MG disintegrating tablet, Place 1 tablet on the tongue 1 (One) Time As Needed for Migraine. May repeat in 2 hours if needed, Disp: 9 tablet, Rfl: 1    methocarbamol (ROBAXIN) 500 MG tablet, Take  "0.5-1 tablets by mouth 3 (Three) Times a Day As Needed for Muscle Spasms., Disp: 30 tablet, Rfl: 1    The following portions of the patient's history were reviewed and updated as appropriate: allergies, current medications, past family history, past medical history, past social history, past surgical history, and problem list.      REVIEW OF PERTINENT MEDICAL DATA              9/20/24 Creatinine 0.66, Platelets 294 (10*3)    Review of Systems   Constitutional:  Negative for activity change, fatigue and fever.   Respiratory:  Negative for cough and chest tightness.    Cardiovascular:  Negative for chest pain.   Gastrointestinal:  Positive for constipation. Negative for abdominal pain and diarrhea.   Musculoskeletal:  Positive for back pain.   Neurological:  Positive for weakness (left hand arm) and numbness (left hand arm). Negative for dizziness, light-headedness and headaches.   Psychiatric/Behavioral:  Positive for sleep disturbance. Negative for agitation and suicidal ideas. The patient is not nervous/anxious.      I have reviewed and confirmed the accuracy of the ROS as documented by the MA/LPN/RN Mary Alice Suarez MD      Vitals:    03/21/25 0810   BP: 112/77   BP Location: Left arm   Patient Position: Sitting   Cuff Size: Adult   Pulse: 73   Resp: 12   Temp: 96.3 °F (35.7 °C)   TempSrc: Temporal   SpO2: 99%   Weight: 71.2 kg (157 lb)   Height: 170.2 cm (67\")   PainSc: 0-No pain         Objective   Physical Exam  Constitutional:       General: She is not in acute distress.  Pulmonary:      Effort: Pulmonary effort is normal. No respiratory distress.   Musculoskeletal:      Comments: Ambulation: Without assistive device   Lumbar Exam:  Appearance: Scoliotic curve absent and scarring absent  Able to tandem, toe, and heel walk: Yes  Palpated over lumbosacral paravertebral regions and transverse processes with positive tenderness appreciated, Bilateral.   Sacroiliac joints are not tender, Bilateral.  Trochanteric " bursa are not tender, Bilateral.  Straight leg raise is negative radiculopathy, Bilateral.  Slump test is negative  radiculopathy, Bilateral.  Facet loading is positive for pain, Bilateral.  Paraspinal/adjacent lumbar musculature are tender to palpation, Bilateral.  Cervical Exam:  Tense bands of tender muscle tissue in the trapezius, levator and rhomboid regions.    Skin:     General: Skin is warm and dry.   Neurological:      Mental Status: She is alert.   Psychiatric:         Mood and Affect: Mood normal.         Thought Content: Thought content normal.         Assessment & Plan   Diagnoses and all orders for this visit:    1. 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 Chronic midline low back pain without sciatica    3. Myofasciitis    4. Muscle spasm    Other orders  -     methocarbamol (ROBAXIN) 500 MG tablet; Take 0.5-1 tablets by mouth 3 (Three) Times a Day As Needed for Muscle Spasms.  Dispense: 30 tablet; Refill: 1          - Pertinent labs reviewed.   - Pertinent imaging reviewed.   - Methocarbamol prescribed to see if that has fewer side effects than Flexeril.  Discussed medication with the patient.  Included in this discussion was the potential for side effects and adverse events.  Patient verbalized understanding and wished to proceed.  Prescription will be sent to pharmacy.   - Trigger point injection(s) could be of benefit if the muscle pain is severe or no longer responding to dry needling.    - Discussed lumbar Medial branch blocks and Radiofrequency ablation (thermal, non-pulsed) for lumbar facet-mediated pain if it becomes more constant.    - Giovanna PAPA Lao reports a pain score of 0.  Given her pain assessment as noted, treatment options were discussed and the following options were decided upon as a follow-up plan to address the patient's pain: prescription for non-opiod analgesics.    - Patient screened positive for depression based on a PHQ-9 score of   on . Follow-up recommendations include:  . .  - Giovanna PAPA Lao  reports that she has never smoked. She has never used smokeless tobacco.     --- Follow-up 2-3 months           Mary Alice Suarez MD  Pain Management    EMR Dragon/Transcription disclaimer:   Part of this note may be an electronic transcription/translation of spoken language to printed text using the Dragon Dictation System.

## 2025-03-21 ENCOUNTER — OFFICE VISIT (OUTPATIENT)
Dept: PAIN MEDICINE | Facility: CLINIC | Age: 35
End: 2025-03-21
Payer: OTHER GOVERNMENT

## 2025-03-21 ENCOUNTER — TELEPHONE (OUTPATIENT)
Dept: SLEEP MEDICINE | Facility: HOSPITAL | Age: 35
End: 2025-03-21
Payer: OTHER GOVERNMENT

## 2025-03-21 VITALS
BODY MASS INDEX: 24.64 KG/M2 | DIASTOLIC BLOOD PRESSURE: 77 MMHG | HEIGHT: 67 IN | RESPIRATION RATE: 12 BRPM | WEIGHT: 157 LBS | HEART RATE: 73 BPM | TEMPERATURE: 96.3 F | SYSTOLIC BLOOD PRESSURE: 112 MMHG | OXYGEN SATURATION: 99 %

## 2025-03-21 DIAGNOSIS — G89.29 CHRONIC MIDLINE LOW BACK PAIN WITHOUT SCIATICA: ICD-10-CM

## 2025-03-21 DIAGNOSIS — M47.816 SPONDYLOSIS OF LUMBAR REGION WITHOUT MYELOPATHY OR RADICULOPATHY: Primary | ICD-10-CM

## 2025-03-21 DIAGNOSIS — M60.9 MYOFASCIITIS: ICD-10-CM

## 2025-03-21 DIAGNOSIS — M62.838 MUSCLE SPASM: ICD-10-CM

## 2025-03-21 DIAGNOSIS — M54.50 CHRONIC MIDLINE LOW BACK PAIN WITHOUT SCIATICA: ICD-10-CM

## 2025-03-21 PROCEDURE — 99204 OFFICE O/P NEW MOD 45 MIN: CPT | Performed by: ANESTHESIOLOGY

## 2025-03-21 RX ORDER — METHOCARBAMOL 500 MG/1
250-500 TABLET, FILM COATED ORAL 3 TIMES DAILY PRN
Qty: 30 TABLET | Refills: 1 | Status: SHIPPED | OUTPATIENT
Start: 2025-03-21

## 2025-03-21 NOTE — TELEPHONE ENCOUNTER
Pt called back in and stated that [facility] needed Dr Foster to call them for Auth on machine before they could continue with setup  and they would also schedule setup

## 2025-03-21 NOTE — PATIENT INSTRUCTIONS
"Radiofrequency ablation (RFA):     - Radiofrequency ablation is a term used to describe cauterization or \"burning.\"   - In pain management, we can use this technique with a special needle to target and destroy areas that are causing your pain.   - In most cases, you must have TWO successful \"test injections\" before you are a candidate for RFA.    After your RFA:   - Because heat is used in this technique, it is common to have soreness after the procedure.  Sometimes \"neuritis\" occurs, which feels like tingling, prickly, or sunburn under the skin sensations.   - Ice packs are helpful in decreasing this soreness and preventing post-procedure \"neuritis\" pain.  Use an ice pack for 20 minutes at a time at least 3 times the day of and the day after your procedure.   - It is common to have arm/leg numbness or weakness the day of your procedure, but this should wear off by the following day.   - It may take up to 6 weeks to gain full benefit from this procedure.   "

## 2025-04-09 ENCOUNTER — TELEPHONE (OUTPATIENT)
Dept: SLEEP MEDICINE | Facility: HOSPITAL | Age: 35
End: 2025-04-09
Payer: OTHER GOVERNMENT

## 2025-04-22 ENCOUNTER — TELEPHONE (OUTPATIENT)
Dept: SURGERY | Facility: CLINIC | Age: 35
End: 2025-04-22
Payer: OTHER GOVERNMENT

## 2025-04-22 NOTE — TELEPHONE ENCOUNTER
HUB OK TO READ/SCHD    Attempted to reach patient to schedule her an office visit per her appointment request. No answer, left VM.

## 2025-04-23 DIAGNOSIS — K64.9 HEMORRHOIDS, UNSPECIFIED HEMORRHOID TYPE: Primary | ICD-10-CM

## 2025-04-23 DIAGNOSIS — R19.4 BOWEL HABIT CHANGES: ICD-10-CM

## 2025-04-28 DIAGNOSIS — R09.81 NASAL CONGESTION: Primary | ICD-10-CM

## 2025-04-28 DIAGNOSIS — J34.2 NASAL SEPTAL DEVIATION: ICD-10-CM

## 2025-05-01 ENCOUNTER — OFFICE VISIT (OUTPATIENT)
Dept: SURGERY | Facility: CLINIC | Age: 35
End: 2025-05-01
Payer: OTHER GOVERNMENT

## 2025-05-01 VITALS
SYSTOLIC BLOOD PRESSURE: 116 MMHG | DIASTOLIC BLOOD PRESSURE: 74 MMHG | BODY MASS INDEX: 25.05 KG/M2 | HEIGHT: 67 IN | OXYGEN SATURATION: 98 % | HEART RATE: 81 BPM | WEIGHT: 159.6 LBS

## 2025-05-01 DIAGNOSIS — R14.0 ABDOMINAL BLOATING: ICD-10-CM

## 2025-05-01 DIAGNOSIS — R19.4 CHANGE IN BOWEL HABIT: Primary | ICD-10-CM

## 2025-05-01 NOTE — H&P (VIEW-ONLY)
Colorectal & General Surgery  Follow - Up    Patient: Giovanna Lao  YOB: 1990  MRN: [id number]      Assessment  Giovanna Lao is a 34 y.o. female previously established with me with history of hemorrhoids who now presents to the office for complaints abdominal bloating and change in bowel habits.  Differential includes IBS, SIBO, or some other functional GI disorder.  We will start by adding fiber to her diet and having her take Metamucil powder twice a day.  I will ask our gastroenterology colleagues to see her for a more thorough medical evaluation of her gastrointestinal system.  I also recommended that she undergo a colonoscopy.  We discussed the risk, benefits, and alternatives to the procedure; informed consent was obtained.  I will try to get her colonoscopy done prior to her seeing the gastroenterology team so that they have more data to use.    Chief Complaint: Change in bowel habits    History of Present Illness   Giovanna Lao is a 34 y.o. female who presents to the office with complaints of change in bowel habits.  She says that she has 0 or 1 bowel movements every day and that for the past few months, they are always liquid or very very soft.  She also has a significant amount of flatulence and increasing abdominal bloating.  She denies any significant pain with eating, epigastric abdominal pain, dysphagia, or reflux.  She also denies incontinence, tenesmus, hematochezia, melena.  She has taken probiotics and prebiotic's but has not noticed a difference.    Most recent colonoscopy: Never    Past Medical History   Past Medical History:   Diagnosis Date    ADHD 10/2023    Allergic     Anemia 4/3/2021    During pregnancy. Iron is still low now, but i take a supplement.    Anxiety 2017    Asthma 1997    Manageable    Depression February 2023    PPD    Fibroid 2018    Gastritis     GERD (gastroesophageal reflux disease)     Headache, tension-type     Hemorrhoids     History of UTI  "    Hx of impetigo     Hx of thoracic outlet syndrome     LEFT:  PERMANENT NUMBNESS  LEFT CHEST WALL, OUTSIDE OF HAND    Hyperlipidemia     Irritable bowel syndrome     IBS-C    Joint pain     Knee pain, periodically    Low back pain     Lumbosacral disc disease     Mental problems     Migraines     Neck pain     Neuromuscular disorder 2004    Thoracic Outlet Syndrome    Panic disorder 2017    Infrequent panic attacks    Peripheral neuropathy     Polycystic ovary syndrome 2018    Doctor commented it was \"probable\"    Scoliosis 2004    Mild    TMJ dysfunction     Visual impairment         Past Surgical History   Past Surgical History:   Procedure Laterality Date    ABDOMINAL SURGERY  2021     section    BONE RESECTION, RIB Left     EXTRA RIB:  22 YEARS OLD     SECTION      D & C FIRST TRIMESTER / TX INCOMPLETE / MISSED / SEPTIC / INDUCED       ENDOSCOPY   &     EPIDURAL BLOCK       section    HEMORRHOIDECTOMY N/A 2024    Procedure: HEMORRHOIDECTOMY;  Surgeon: Asaf Cosby MD;  Location: [facility] MAIN OR;  Service: General;  Laterality: N/A;    NECK SURGERY      Rib resection    WISDOM TOOTH EXTRACTION  2011       Social History  Social History     Socioeconomic History    Marital status:    Tobacco Use    Smoking status: Never    Smokeless tobacco: Never   Vaping Use    Vaping status: Never Used   Substance and Sexual Activity    Alcohol use: Yes     Alcohol/week: 1.0 standard drink of alcohol     Types: 1 Glasses of wine per week     Comment: 1x/week    Drug use: Never    Sexual activity: Yes     Partners: Male     Birth control/protection: Birth control pill     Comment: 91 day pack       Family History  Family History   Problem Relation Age of Onset    Sleep apnea Mother     Alcohol abuse Mother     Depression Mother     Asthma Mother     Nephrolithiasis Mother     Anxiety disorder Mother         KAREN and Agoraphobia    " Suicide Attempts Mother         At least 2    Arthritis Mother         Knees    Mental illness Mother     Miscarriages / Stillbirths Mother     Colon polyps Mother     GI problems Mother     Migraines Mother     Obesity Mother     Depression Father     Hypertension Father     Paranoid behavior Father     Mental illness Father     Obesity Father     Depression Brother     Bipolar disorder Brother     ADD / ADHD Brother     Mental illness Brother     Schizophrenia Maternal Uncle     Glaucoma Maternal Grandmother     Narcolepsy Paternal Grandmother     Heart attack Paternal Grandfather     Stroke Paternal Grandfather     Diabetes Paternal Grandfather     Colon cancer Neg Hx     Cervical cancer Neg Hx     Uterine cancer Neg Hx     Ovarian cancer Neg Hx     Breast cancer Neg Hx     Thyroid cancer Neg Hx     Malig Hyperthermia Neg Hx        Colorectal cancer family history: None    Review of Systems  Negative except as documented in the HPI.     Allergies  Allergies   Allergen Reactions    Wellbutrin [Bupropion] Other (See Comments)     Worsening depression.       Medications    Current Outpatient Medications:     ALBUTEROL IN, Inhale 1 puff Every 4 (Four) Hours As Needed., Disp: , Rfl:     ALPRAZolam (XANAX) 0.25 MG tablet, Take 1 tablet by mouth 2 (Two) Times a Day As Needed for Anxiety., Disp: 30 tablet, Rfl: 0    celecoxib (CeleBREX) 100 MG capsule, Take 1 capsule by mouth 2 (Two) Times a Day As Needed for Mild Pain., Disp: 60 capsule, Rfl: 3    escitalopram (LEXAPRO) 20 MG tablet, Take 1 tablet by mouth Every Evening., Disp: 90 tablet, Rfl: 1    Ferrous Sulfate (IRON PO), Take 1 tablet by mouth Daily., Disp: , Rfl:     fluticasone (FLONASE) 50 MCG/ACT nasal spray, Administer 2 sprays into the nostril(s) as directed by provider Every Evening., Disp: , Rfl:     fluticasone-salmeterol (Advair HFA) 115-21 MCG/ACT inhaler, Inhale 2 puffs 2 (Two) Times a Day., Disp: 1 each, Rfl: 2    Jolessa 0.15-0.03 MG per tablet, Take 1  tablet by mouth Daily., Disp: 90 tablet, Rfl: 3    ketoconazole (NIZORAL) 2 % shampoo, Apply  topically to the appropriate area as directed 2 (Two) Times a Week., Disp: 120 mL, Rfl: 0    lidocaine (LIDODERM) 5 %, Place 1 patch on the skin as directed by provider Daily. Remove & Discard patch within 12 hours or as directed by MD (Patient taking differently: Place 1 patch on the skin as directed by provider Daily As Needed. Remove & Discard patch within 12 hours or as directed by MD), Disp: 30 each, Rfl: 5    methocarbamol (ROBAXIN) 500 MG tablet, Take 0.5-1 tablets by mouth 3 (Three) Times a Day As Needed for Muscle Spasms., Disp: 30 tablet, Rfl: 1    montelukast (SINGULAIR) 10 MG tablet, TAKE 1 TABLET BY MOUTH EVERY NIGHT, Disp: 90 tablet, Rfl: 2    RIBOFLAVIN PO, Take 1 tablet by mouth Daily., Disp: , Rfl:     rizatriptan MLT (Maxalt-MLT) 10 MG disintegrating tablet, Place 1 tablet on the tongue 1 (One) Time As Needed for Migraine. May repeat in 2 hours if needed, Disp: 9 tablet, Rfl: 1    QUERCETIN PO, Take 1 tablet by mouth Daily. (Patient not taking: Reported on 5/1/2025), Disp: , Rfl:     Vital Signs  Vitals:    05/01/25 0808   BP: 116/74   Pulse: 81   SpO2: 98%        Physical Exam  Constitutional: Resting comfortably, no acute distress  Neck: Supple, trachea midline  Respiratory: No increased work of breathing, Symmetric excursion  Cardiovascular: Well pefursed, no jugular venous distention evident   Abdominal: Soft, non-tender, non-distended  Lymphatics: No cervical or suprascapular adenopathy  Skin: Warm, dry, no rash on visualized skin surfaces  Musculoskeletal: Symmetric strength, no obvious gross abnormalities  Psychiatric: Alert and oriented ×3, normal affect          Alexander Cosby MD  Colorectal & General Surgery  [facility]    [address], [address]  [place], [place], [address]  P: [phone]  F: [phone]

## 2025-05-01 NOTE — PROGRESS NOTES
Colorectal & General Surgery  Follow - Up    Patient: Giovanna Lao  YOB: 1990  MRN: [id number]      Assessment  Giovanna Lao is a 34 y.o. female previously established with me with history of hemorrhoids who now presents to the office for complaints abdominal bloating and change in bowel habits.  Differential includes IBS, SIBO, or some other functional GI disorder.  We will start by adding fiber to her diet and having her take Metamucil powder twice a day.  I will ask our gastroenterology colleagues to see her for a more thorough medical evaluation of her gastrointestinal system.  I also recommended that she undergo a colonoscopy.  We discussed the risk, benefits, and alternatives to the procedure; informed consent was obtained.  I will try to get her colonoscopy done prior to her seeing the gastroenterology team so that they have more data to use.    Chief Complaint: Change in bowel habits    History of Present Illness   Giovanna Lao is a 34 y.o. female who presents to the office with complaints of change in bowel habits.  She says that she has 0 or 1 bowel movements every day and that for the past few months, they are always liquid or very very soft.  She also has a significant amount of flatulence and increasing abdominal bloating.  She denies any significant pain with eating, epigastric abdominal pain, dysphagia, or reflux.  She also denies incontinence, tenesmus, hematochezia, melena.  She has taken probiotics and prebiotic's but has not noticed a difference.    Most recent colonoscopy: Never    Past Medical History   Past Medical History:   Diagnosis Date    ADHD 10/2023    Allergic     Anemia 4/3/2021    During pregnancy. Iron is still low now, but i take a supplement.    Anxiety 2017    Asthma 1997    Manageable    Depression February 2023    PPD    Fibroid 2018    Gastritis     GERD (gastroesophageal reflux disease)     Headache, tension-type     Hemorrhoids     History of UTI  "    Hx of impetigo     Hx of thoracic outlet syndrome     LEFT:  PERMANENT NUMBNESS  LEFT CHEST WALL, OUTSIDE OF HAND    Hyperlipidemia     Irritable bowel syndrome     IBS-C    Joint pain     Knee pain, periodically    Low back pain     Lumbosacral disc disease     Mental problems     Migraines     Neck pain     Neuromuscular disorder 2004    Thoracic Outlet Syndrome    Panic disorder 2017    Infrequent panic attacks    Peripheral neuropathy     Polycystic ovary syndrome 2018    Doctor commented it was \"probable\"    Scoliosis 2004    Mild    TMJ dysfunction     Visual impairment         Past Surgical History   Past Surgical History:   Procedure Laterality Date    ABDOMINAL SURGERY  2021     section    BONE RESECTION, RIB Left     EXTRA RIB:  22 YEARS OLD     SECTION      D & C FIRST TRIMESTER / TX INCOMPLETE / MISSED / SEPTIC / INDUCED       ENDOSCOPY   &     EPIDURAL BLOCK       section    HEMORRHOIDECTOMY N/A 2024    Procedure: HEMORRHOIDECTOMY;  Surgeon: Asaf Cosby MD;  Location: [facility] MAIN OR;  Service: General;  Laterality: N/A;    NECK SURGERY      Rib resection    WISDOM TOOTH EXTRACTION  2011       Social History  Social History     Socioeconomic History    Marital status:    Tobacco Use    Smoking status: Never    Smokeless tobacco: Never   Vaping Use    Vaping status: Never Used   Substance and Sexual Activity    Alcohol use: Yes     Alcohol/week: 1.0 standard drink of alcohol     Types: 1 Glasses of wine per week     Comment: 1x/week    Drug use: Never    Sexual activity: Yes     Partners: Male     Birth control/protection: Birth control pill     Comment: 91 day pack       Family History  Family History   Problem Relation Age of Onset    Sleep apnea Mother     Alcohol abuse Mother     Depression Mother     Asthma Mother     Nephrolithiasis Mother     Anxiety disorder Mother         KAREN and Agoraphobia    " Suicide Attempts Mother         At least 2    Arthritis Mother         Knees    Mental illness Mother     Miscarriages / Stillbirths Mother     Colon polyps Mother     GI problems Mother     Migraines Mother     Obesity Mother     Depression Father     Hypertension Father     Paranoid behavior Father     Mental illness Father     Obesity Father     Depression Brother     Bipolar disorder Brother     ADD / ADHD Brother     Mental illness Brother     Schizophrenia Maternal Uncle     Glaucoma Maternal Grandmother     Narcolepsy Paternal Grandmother     Heart attack Paternal Grandfather     Stroke Paternal Grandfather     Diabetes Paternal Grandfather     Colon cancer Neg Hx     Cervical cancer Neg Hx     Uterine cancer Neg Hx     Ovarian cancer Neg Hx     Breast cancer Neg Hx     Thyroid cancer Neg Hx     Malig Hyperthermia Neg Hx        Colorectal cancer family history: None    Review of Systems  Negative except as documented in the HPI.     Allergies  Allergies   Allergen Reactions    Wellbutrin [Bupropion] Other (See Comments)     Worsening depression.       Medications    Current Outpatient Medications:     ALBUTEROL IN, Inhale 1 puff Every 4 (Four) Hours As Needed., Disp: , Rfl:     ALPRAZolam (XANAX) 0.25 MG tablet, Take 1 tablet by mouth 2 (Two) Times a Day As Needed for Anxiety., Disp: 30 tablet, Rfl: 0    celecoxib (CeleBREX) 100 MG capsule, Take 1 capsule by mouth 2 (Two) Times a Day As Needed for Mild Pain., Disp: 60 capsule, Rfl: 3    escitalopram (LEXAPRO) 20 MG tablet, Take 1 tablet by mouth Every Evening., Disp: 90 tablet, Rfl: 1    Ferrous Sulfate (IRON PO), Take 1 tablet by mouth Daily., Disp: , Rfl:     fluticasone (FLONASE) 50 MCG/ACT nasal spray, Administer 2 sprays into the nostril(s) as directed by provider Every Evening., Disp: , Rfl:     fluticasone-salmeterol (Advair HFA) 115-21 MCG/ACT inhaler, Inhale 2 puffs 2 (Two) Times a Day., Disp: 1 each, Rfl: 2    Jolessa 0.15-0.03 MG per tablet, Take 1  tablet by mouth Daily., Disp: 90 tablet, Rfl: 3    ketoconazole (NIZORAL) 2 % shampoo, Apply  topically to the appropriate area as directed 2 (Two) Times a Week., Disp: 120 mL, Rfl: 0    lidocaine (LIDODERM) 5 %, Place 1 patch on the skin as directed by provider Daily. Remove & Discard patch within 12 hours or as directed by MD (Patient taking differently: Place 1 patch on the skin as directed by provider Daily As Needed. Remove & Discard patch within 12 hours or as directed by MD), Disp: 30 each, Rfl: 5    methocarbamol (ROBAXIN) 500 MG tablet, Take 0.5-1 tablets by mouth 3 (Three) Times a Day As Needed for Muscle Spasms., Disp: 30 tablet, Rfl: 1    montelukast (SINGULAIR) 10 MG tablet, TAKE 1 TABLET BY MOUTH EVERY NIGHT, Disp: 90 tablet, Rfl: 2    RIBOFLAVIN PO, Take 1 tablet by mouth Daily., Disp: , Rfl:     rizatriptan MLT (Maxalt-MLT) 10 MG disintegrating tablet, Place 1 tablet on the tongue 1 (One) Time As Needed for Migraine. May repeat in 2 hours if needed, Disp: 9 tablet, Rfl: 1    QUERCETIN PO, Take 1 tablet by mouth Daily. (Patient not taking: Reported on 5/1/2025), Disp: , Rfl:     Vital Signs  Vitals:    05/01/25 0808   BP: 116/74   Pulse: 81   SpO2: 98%        Physical Exam  Constitutional: Resting comfortably, no acute distress  Neck: Supple, trachea midline  Respiratory: No increased work of breathing, Symmetric excursion  Cardiovascular: Well pefursed, no jugular venous distention evident   Abdominal: Soft, non-tender, non-distended  Lymphatics: No cervical or suprascapular adenopathy  Skin: Warm, dry, no rash on visualized skin surfaces  Musculoskeletal: Symmetric strength, no obvious gross abnormalities  Psychiatric: Alert and oriented ×3, normal affect          Alexander Cosby MD  Colorectal & General Surgery  [facility]    [address], [address]  [place], [place], [address]  P: [phone]  F: [phone]

## 2025-05-13 ENCOUNTER — TELEPHONE (OUTPATIENT)
Dept: SLEEP MEDICINE | Facility: HOSPITAL | Age: 35
End: 2025-05-13
Payer: OTHER GOVERNMENT

## 2025-05-13 ENCOUNTER — OFFICE VISIT (OUTPATIENT)
Dept: SLEEP MEDICINE | Facility: HOSPITAL | Age: 35
End: 2025-05-13
Payer: OTHER GOVERNMENT

## 2025-05-13 VITALS — BODY MASS INDEX: 25.11 KG/M2 | HEIGHT: 67 IN | HEART RATE: 70 BPM | OXYGEN SATURATION: 97 % | WEIGHT: 160 LBS

## 2025-05-13 DIAGNOSIS — G47.33 OSA (OBSTRUCTIVE SLEEP APNEA): Primary | ICD-10-CM

## 2025-05-13 DIAGNOSIS — G47.14 HYPERSOMNIA DUE TO MEDICAL CONDITION: ICD-10-CM

## 2025-05-13 DIAGNOSIS — G47.36 HYPOXEMIA ASSOCIATED WITH SLEEP: ICD-10-CM

## 2025-05-13 PROCEDURE — G0463 HOSPITAL OUTPT CLINIC VISIT: HCPCS

## 2025-05-13 PROCEDURE — 99213 OFFICE O/P EST LOW 20 MIN: CPT | Performed by: INTERNAL MEDICINE

## 2025-05-13 NOTE — PROGRESS NOTES
"[facility]  Follow Up Sleep Disorders Center Note     Chief Complaint:  OMAR     Primary Care Physician: Chanda Gomez APRN    Interval History:   The patient is a 34 y.o. female who I last saw 2/26/2025 and that note was reviewed.  The pain patient did have home sleep study performed 3/6/2025.  Mild obstructive sleep apnea with AHI 6.6 events per hour noted.  Low oxygen saturation 85% and sleep-related hypoxia present for 11.8 minutes.  The patient snored 30.1% of total monitoring time.  Auto CPAP initiated the patient is here today for follow-up.    She states she is worse because CPAP is waking her up.  She has nasal congestion and some ear pain.  She is dry.  She reports excessive sleepiness.    She goes to bed between 11 PM and midnight gets out of bed at 7 AM.    Additionally, she is awakening more due to back pain.  She is undergoing PT.  She needs to move to be more comfortable.    Review of Systems:    A complete review of systems was done and all were negative with the exception of some nasal congestion, abdominal bloating, ear pain, and anxiety and depression    Social History:    Social History     Socioeconomic History    Marital status:    Tobacco Use    Smoking status: Never    Smokeless tobacco: Never   Vaping Use    Vaping status: Never Used   Substance and Sexual Activity    Alcohol use: Yes     Alcohol/week: 1.0 standard drink of alcohol     Types: 1 Glasses of wine per week     Comment: 1x/week    Drug use: Never    Sexual activity: Yes     Partners: Male     Birth control/protection: Birth control pill     Comment: 91 day pack       Allergies:  Wellbutrin [bupropion]     Medication Review: Her list was reviewed.  She takes Zyrtec at bedtime.    Vital Signs:    Vitals:    05/13/25 0828   Pulse: 70   SpO2: 97%   Weight: 72.6 kg (160 lb)   Height: 170.2 cm (67\")     Body mass index is 25.06 kg/m².    Physical Exam:    Constitutional:  Well developed 34 y.o. female that " appears in no apparent distress.  Awake & oriented times 3.  Normal mood with normal recent and remote memory and normal judgement.  Eyes:  Conjunctivae normal.  Oropharynx: Previously, moist mucous membranes without exudate and a normal-sized tongue and uvula and patent posterior pharyngeal opening and class II Mallampati airway     Self-administered [place] Sleepiness Scale test results: 13  0-5 Lower normal daytime sleepiness  6-10 Higher normal daytime sleepiness  11-12 Mild, 13-15 Moderate, & 16-24 Severe excessive daytime sleepiness     Downloaded PAP Data Evaluated For Therapeutic Response and Compliance:  DME is [facility] and the patient uses a fullface mask.  Downloads between 4/8 and 5/8/2025 compliance 100%.  Average usage is 6 hours and 32 minutes.  Average AHI is mildly abnormal 6.8 but central and obstructive apnea indices are normal and she does not have a significant leak.  Average auto CPAP pressure is 9.8 and her ResMed auto CPAP is 5-10    I have reviewed the above results and compared them with the patient's last downloads and reviewed with the patient.    Impression:   Mild obstructive sleep apnea with sleep-related hypoxia by home sleep study 3/6/2025, weight 159 pounds, adequately treated with ResMed auto CPAP.  The patient has some persistent complaints of hypersomnolence.    Plan:  Good sleep hygiene measures should be maintained.  Some weight loss would be beneficial in this patient who is overweight by Body mass index is 25.06 kg/m².      After evaluating the patient and assessing results available, the patient is benefiting from the treatment being provided.     The patient will continue ResMed auto CPAP.  Potential side effects of not using PAP therapy reviewed and addressed as needed.  After clinical evaluation and review of downloads, I recommend changes to the patient's pressures.  Based on the patient and her downloads, auto CPAP will be changed to 5-11.  Additionally,  discussed with the patient that she needs to change her humidifier and tube temperature if she gets condensation.  I also reviewed that she could consider using Biotene over-the-counter.  A new prescription will be sent to the patient's DME as needed.    I answered all of the patient's questions.  The patient will call the Sleep Disorder Center for any problems and will follow up in 3-4 months.      Mian Foster MD  Sleep Medicine  05/13/25  08:31 EDT

## 2025-05-14 PROBLEM — G47.10 HYPERSOMNIA WITH SLEEP APNEA: Status: RESOLVED | Noted: 2025-03-02 | Resolved: 2025-05-14

## 2025-05-14 PROBLEM — G47.36 HYPOXEMIA ASSOCIATED WITH SLEEP: Status: ACTIVE | Noted: 2025-05-14

## 2025-05-14 PROBLEM — G47.30 HYPERSOMNIA WITH SLEEP APNEA: Status: RESOLVED | Noted: 2025-03-02 | Resolved: 2025-05-14

## 2025-05-14 PROBLEM — G47.14 HYPERSOMNIA DUE TO MEDICAL CONDITION: Status: ACTIVE | Noted: 2025-05-14

## 2025-05-14 PROBLEM — G47.33 OSA (OBSTRUCTIVE SLEEP APNEA): Status: ACTIVE | Noted: 2025-03-06

## 2025-05-16 ENCOUNTER — ANESTHESIA EVENT (OUTPATIENT)
Dept: GASTROENTEROLOGY | Facility: HOSPITAL | Age: 35
End: 2025-05-16
Payer: OTHER GOVERNMENT

## 2025-05-16 ENCOUNTER — ANESTHESIA (OUTPATIENT)
Dept: GASTROENTEROLOGY | Facility: HOSPITAL | Age: 35
End: 2025-05-16
Payer: OTHER GOVERNMENT

## 2025-05-16 ENCOUNTER — HOSPITAL ENCOUNTER (OUTPATIENT)
Facility: HOSPITAL | Age: 35
Setting detail: HOSPITAL OUTPATIENT SURGERY
Discharge: HOME | End: 2025-05-16
Attending: SURGERY | Admitting: SURGERY
Payer: OTHER GOVERNMENT

## 2025-05-16 VITALS
SYSTOLIC BLOOD PRESSURE: 109 MMHG | WEIGHT: 159 LBS | BODY MASS INDEX: 23.55 KG/M2 | HEART RATE: 67 BPM | RESPIRATION RATE: 20 BRPM | DIASTOLIC BLOOD PRESSURE: 81 MMHG | HEIGHT: 69 IN | OXYGEN SATURATION: 99 %

## 2025-05-16 DIAGNOSIS — R19.4 CHANGE IN BOWEL HABIT: ICD-10-CM

## 2025-05-16 DIAGNOSIS — R14.0 ABDOMINAL BLOATING: ICD-10-CM

## 2025-05-16 LAB
B-HCG UR QL: NEGATIVE
EXPIRATION DATE: ABNORMAL
INTERNAL NEGATIVE CONTROL: NEGATIVE
INTERNAL POSITIVE CONTROL: POSITIVE
Lab: ABNORMAL

## 2025-05-16 PROCEDURE — 88305 TISSUE EXAM BY PATHOLOGIST: CPT | Performed by: SURGERY

## 2025-05-16 PROCEDURE — 25010000002 PROPOFOL 1000 MG/100ML EMULSION: Performed by: NURSE ANESTHETIST, CERTIFIED REGISTERED

## 2025-05-16 PROCEDURE — 81025 URINE PREGNANCY TEST: CPT | Performed by: SURGERY

## 2025-05-16 PROCEDURE — 45380 COLONOSCOPY AND BIOPSY: CPT | Performed by: SURGERY

## 2025-05-16 PROCEDURE — 25810000003 LACTATED RINGERS PER 1000 ML: Performed by: SURGERY

## 2025-05-16 PROCEDURE — 25010000002 PROPOFOL 10 MG/ML EMULSION: Performed by: NURSE ANESTHETIST, CERTIFIED REGISTERED

## 2025-05-16 PROCEDURE — 25010000002 LIDOCAINE 2% SOLUTION: Performed by: NURSE ANESTHETIST, CERTIFIED REGISTERED

## 2025-05-16 RX ORDER — LIDOCAINE HYDROCHLORIDE 20 MG/ML
INJECTION, SOLUTION INFILTRATION; PERINEURAL AS NEEDED
Status: DISCONTINUED | OUTPATIENT
Start: 2025-05-16 | End: 2025-05-16 | Stop reason: SURG

## 2025-05-16 RX ORDER — SODIUM CHLORIDE, SODIUM LACTATE, POTASSIUM CHLORIDE, CALCIUM CHLORIDE 600; 310; 30; 20 MG/100ML; MG/100ML; MG/100ML; MG/100ML
50 INJECTION, SOLUTION INTRAVENOUS CONTINUOUS
Status: DISCONTINUED | OUTPATIENT
Start: 2025-05-16 | End: 2025-05-16 | Stop reason: HOSPADM

## 2025-05-16 RX ORDER — PROPOFOL 10 MG/ML
VIAL (ML) INTRAVENOUS AS NEEDED
Status: DISCONTINUED | OUTPATIENT
Start: 2025-05-16 | End: 2025-05-16 | Stop reason: SURG

## 2025-05-16 RX ORDER — PROPOFOL 10 MG/ML
INJECTION, EMULSION INTRAVENOUS CONTINUOUS PRN
Status: DISCONTINUED | OUTPATIENT
Start: 2025-05-16 | End: 2025-05-16 | Stop reason: SURG

## 2025-05-16 RX ADMIN — PROPOFOL INJECTABLE EMULSION 140 MCG/KG/MIN: 10 INJECTION, EMULSION INTRAVENOUS at 13:15

## 2025-05-16 RX ADMIN — PROPOFOL 80 MG: 10 INJECTION, EMULSION INTRAVENOUS at 13:18

## 2025-05-16 RX ADMIN — SODIUM CHLORIDE, POTASSIUM CHLORIDE, SODIUM LACTATE AND CALCIUM CHLORIDE 50 ML/HR: 600; 310; 30; 20 INJECTION, SOLUTION INTRAVENOUS at 12:09

## 2025-05-16 RX ADMIN — PROPOFOL 130 MG: 10 INJECTION, EMULSION INTRAVENOUS at 13:15

## 2025-05-16 RX ADMIN — LIDOCAINE HYDROCHLORIDE 60 MG: 20 INJECTION, SOLUTION INFILTRATION; PERINEURAL at 13:15

## 2025-05-16 NOTE — ANESTHESIA PREPROCEDURE EVALUATION
Anesthesia Evaluation     Patient summary reviewed   no history of anesthetic complications:   NPO Solid Status: > 8 hours  NPO Liquid Status: > 2 hours           Airway   Mallampati: II  TM distance: >3 FB  Neck ROM: full  No difficulty expected  Dental - normal exam     Pulmonary     breath sounds clear to auscultation  (+) asthma,sleep apnea (100% compliant) on CPAP  (-) shortness of breath, recent URI, not a smoker  Cardiovascular   Exercise tolerance: good (4-7 METS)    Rhythm: regular  Rate: normal    (+) hyperlipidemia  (-) past MI, dysrhythmias, angina      Neuro/Psych  (+) headaches (migraine hx), numbness (L hand and chest, following yrs of TOS and rib resection 2012), psychiatric history (w/ panic attacks) Anxiety, Depression and ADHD  (-) seizures, CVA  GI/Hepatic/Renal/Endo    (+) GERD  (-)  obesity    Musculoskeletal     (+) neck pain  (-) neck stiffness  Abdominal    Substance History      OB/GYN    (-)  Pregnant    Comment:    - PCOS      Other   arthritis,                       Anesthesia Plan    ASA 2     MAC     (MAC anesthesia discussed with patient and/or patient representative. Risks (including but not limited to intra-op awareness), benefits, and alternatives were discussed. Understanding was voiced with an agreement to proceed with a MAC technique and General as a backup option. )    Anesthetic plan, risks, benefits, and alternatives have been provided, discussed and informed consent has been obtained with: patient.        CODE STATUS:

## 2025-05-16 NOTE — OP NOTE
Colorectal & General Surgery  Operative Report    Patient: Giovanna Lao  YOB: 1990  MRN: [id number]  DATE OF PROCEDURE: 05/16/25     PREOPERATIVE DIAGNOSIS:  Change in bowel habits  Abdominal bloating    POSTOPERATIVE DIAGNOSIS:  Normal colonic and rectal mucosa    PROCEDURE:  Colonoscopy to cecum with biopsies    FINDINGS:  Normal colonic rectal mucosa.  Random biopsies obtained from the cecum, the right colon, left colon, and the rectum.    RECOMMENDATIONS:   Await pathology results    SURGEON:  Alexander Cosby MD    ANESTHESIA:  Monitored anesthesia care    EBL:  None    SPECIMEN:  Cecal biopsies  Right colon biopsies  Left colon biopsies  Rectal biopsies    OPERATIVE DESCRIPTION:  The patient was brought to the endoscopy suite under the care of the nursing staff.  A preoperative timeout was performed.  Monitored anesthesia care was initiated.  A digital rectal examination was performed.  The endoscope was inserted into the anus and advanced carefully to the cecum.  The endoscope was then withdrawn and the entire mucosal surface of the colon and rectum were visualized.  Cold forcep biopsies obtained the cecum, the right colon, the left colon, and the rectum.  The scope was then withdrawn.    The patient tolerated the procedure well and was transferred to the postanesthesia care unit in stable condition.    Alexander Cosby M.D.  Colorectal & General Surgery  [facility]    [address], [address]  [place], [place], [address]  P: [phone]  F: [phone]

## 2025-05-16 NOTE — DISCHARGE INSTRUCTIONS
For the next 24 hours patient needs to be with a responsible adult.    For THE REST OF TODAY DO NOT drive, operate machinery, appliances, drink alcohol, make important decisions or sign legal documents.    Start with a light or bland diet if you are feeling sick to your stomach otherwise advance to regular diet as tolerated.    Follow recommendations on procedure report if provided by your doctor.    Call Dr Cosby for problems .    Problems may include but not limited to: large amounts of bleeding, trouble breathing, repeated vomiting, severe unrelieved pain, fever or chills.      If biopsies or polyps were taken, MD will call you with the results in about 7 days. If you don't hear from the MD in 2 weeks, call the number above.

## 2025-05-16 NOTE — ANESTHESIA POSTPROCEDURE EVALUATION
Patient: Giovanna Lao    Procedure Summary       Date: 05/16/25 Room / Location: [facility] ENDOSCOPY 1 /  [facility]    Anesthesia Start: 1311 Anesthesia Stop: 1335    Procedure: COLONOSCOPY into cecum with bx Diagnosis:       Change in bowel habit      Abdominal bloating      (Change in bowel habit [R19.4])      (Abdominal bloating [R14.0])    Surgeons: Asaf Cosby MD Provider: Baltazar Banuelos MD    Anesthesia Type: MAC ASA Status: 2            Anesthesia Type: MAC    Vitals  Vitals Value Taken Time   /81 05/16/25 13:56   Temp     Pulse 67 05/16/25 13:56   Resp 20 05/16/25 13:56   SpO2 99 % 05/16/25 13:56           Post Anesthesia Care and Evaluation    Patient location during evaluation: PHASE II  Patient participation: complete - patient participated  Level of consciousness: awake and alert  Pain management: adequate    Airway patency: patent  Anesthetic complications: No anesthetic complications  PONV Status: none  Cardiovascular status: acceptable and hemodynamically stable  Respiratory status: acceptable, nonlabored ventilation and spontaneous ventilation  Hydration status: acceptable

## 2025-05-17 DIAGNOSIS — Z91.09 ENVIRONMENTAL ALLERGIES: ICD-10-CM

## 2025-05-17 DIAGNOSIS — J45.20 MILD INTERMITTENT ASTHMA WITHOUT COMPLICATION: ICD-10-CM

## 2025-05-19 DIAGNOSIS — F41.0 GENERALIZED ANXIETY DISORDER WITH PANIC ATTACKS: ICD-10-CM

## 2025-05-19 DIAGNOSIS — F41.1 GENERALIZED ANXIETY DISORDER WITH PANIC ATTACKS: ICD-10-CM

## 2025-05-19 DIAGNOSIS — F32.A DEPRESSION, UNSPECIFIED DEPRESSION TYPE: ICD-10-CM

## 2025-05-19 LAB
CYTO UR: NORMAL
LAB AP CASE REPORT: NORMAL
PATH REPORT.FINAL DX SPEC: NORMAL
PATH REPORT.GROSS SPEC: NORMAL

## 2025-05-19 RX ORDER — MONTELUKAST SODIUM 10 MG/1
10 TABLET ORAL NIGHTLY
Qty: 90 TABLET | Refills: 2 | OUTPATIENT
Start: 2025-05-19

## 2025-05-19 RX ORDER — ESCITALOPRAM OXALATE 20 MG/1
20 TABLET ORAL EVERY EVENING
Qty: 30 TABLET | Refills: 0 | Status: SHIPPED | OUTPATIENT
Start: 2025-05-19

## 2025-05-20 ENCOUNTER — TELEPHONE (OUTPATIENT)
Dept: SURGERY | Facility: CLINIC | Age: 35
End: 2025-05-20
Payer: OTHER GOVERNMENT

## 2025-05-20 NOTE — TELEPHONE ENCOUNTER
I called and discussed the results of her colonoscopy with her.  She has follow-up for medical opinion regarding her GI complaints scheduled with our gastroenterology colleagues.  Follow-up with me as needed.

## 2025-05-21 DIAGNOSIS — R14.0 ABDOMINAL BLOATING: Primary | ICD-10-CM

## 2025-05-29 ENCOUNTER — TELEPHONE (OUTPATIENT)
Dept: INTERNAL MEDICINE | Facility: CLINIC | Age: 35
End: 2025-05-29

## 2025-05-29 NOTE — TELEPHONE ENCOUNTER
DELETE AFTER REVIEWING: Send this encounter to the appropriate pool. See your Call Action Grid or Workflows for direction.    Caller: Giovanna Lao    Relationship: Self    Best call back number: [phone]     What orders are you requesting (i.e. lab or imaging):      In what timeframe would the patient need to come in:      Where will you receive your lab/imaging services:      Additional notes: PATIENT CALLED MADE PHYSICAL APPOINTMENT FOR 9/03/25 AND NEED LABS ORDERED.  PLEASE CALL PATIENT TO SCHEDULE ONCE ORDERED.                social work

## 2025-06-02 ENCOUNTER — PATIENT ROUNDING (BHMG ONLY) (OUTPATIENT)
Dept: GASTROENTEROLOGY | Facility: CLINIC | Age: 35
End: 2025-06-02
Payer: OTHER GOVERNMENT

## 2025-06-02 ENCOUNTER — TELEPHONE (OUTPATIENT)
Dept: GASTROENTEROLOGY | Facility: CLINIC | Age: 35
End: 2025-06-02
Payer: OTHER GOVERNMENT

## 2025-06-02 ENCOUNTER — OFFICE VISIT (OUTPATIENT)
Dept: GASTROENTEROLOGY | Facility: CLINIC | Age: 35
End: 2025-06-02
Payer: OTHER GOVERNMENT

## 2025-06-02 VITALS
DIASTOLIC BLOOD PRESSURE: 75 MMHG | HEIGHT: 69 IN | TEMPERATURE: 97.8 F | BODY MASS INDEX: 24.16 KG/M2 | SYSTOLIC BLOOD PRESSURE: 108 MMHG | HEART RATE: 66 BPM | WEIGHT: 163.1 LBS

## 2025-06-02 DIAGNOSIS — K59.1 FUNCTIONAL DIARRHEA: Primary | ICD-10-CM

## 2025-06-02 DIAGNOSIS — R14.0 ABDOMINAL BLOATING: ICD-10-CM

## 2025-06-02 PROCEDURE — 99204 OFFICE O/P NEW MOD 45 MIN: CPT | Performed by: PHYSICIAN ASSISTANT

## 2025-06-02 NOTE — PROGRESS NOTES
"Chief Complaint  Diarrhea and Gas    Subjective          History Of Present Illness:    Giovanna Lao is a  34 y.o. female patient with a history of anxiety, depression, ADHD, OMAR who presents as a new patient for evaluation of abnormal bowel movements.    Hemorrhoidectomy in December with Dr. Cosby.    Since her hemorrhoidectomy he has been experiencing a change in her bowel patterns. She has soft to loose/watery stools almost daily.  When see is about once a day.  She has had an increase in flatulence and gas. She endorses abdominal bloating. She reports prior to this she was more on the constipated side. She does report fatigue - recent diagnosis of OMAR - started CPAP but not helping.  She denies any abnormal weight loss, poor appetite, melena, hematochezia.    Additional data reviewed:  Colonoscopy 5/16/2025 with Dr. Cosby normal - benign biopsy    Objective   Vital Signs:   /75 (BP Location: Right arm, Patient Position: Sitting, Cuff Size: Adult)   Pulse 66   Temp 97.8 °F (36.6 °C) (Temporal)   Ht 175.3 cm (69\")   Wt 74 kg (163 lb 1.6 oz)   BMI 24.09 kg/m²       Physical Exam  Vitals reviewed.   Constitutional:       General: She is not in acute distress.     Appearance: Normal appearance. She is not ill-appearing.   HENT:      Head: Normocephalic and atraumatic.      Nose: Nose normal.      Mouth/Throat:      Pharynx: Oropharynx is clear.   Eyes:      Conjunctiva/sclera: Conjunctivae normal.   Pulmonary:      Effort: Pulmonary effort is normal.   Abdominal:      General: There is no distension.      Palpations: Abdomen is soft. There is no mass.      Tenderness: There is no abdominal tenderness.   Musculoskeletal:         General: No swelling. Normal range of motion.      Cervical back: Normal range of motion.   Skin:     General: Skin is warm and dry.      Findings: No bruising or rash.   Neurological:      General: No focal deficit present.      Mental Status: She is alert and oriented to " person, place, and time.      Motor: No weakness.      Gait: Gait normal.   Psychiatric:         Mood and Affect: Mood normal.          Result Review :   The following data was reviewed by: Kerry Jerry PA-C on 06/02/2025:  CMP          9/20/2024    06:44   CMP   Glucose 93    BUN 11    Creatinine 0.66    EGFR 119.0    Sodium 134    Potassium 3.6    Chloride 100    Calcium 9.1    BUN/Creatinine Ratio 16.7    Anion Gap 8.0      CBC          9/20/2024    06:44   CBC   WBC 7.88    RBC 4.12    Hemoglobin 12.7    Hematocrit 38.9    MCV 94.4    MCH 30.8    MCHC 32.6    RDW 12.2    Platelets 294            Assessment and Plan    Diagnoses and all orders for this visit:    1. Functional diarrhea (Primary)    2. Abdominal bloating       Check SIBO testing and H pylori breath test two weeks after completion of antibiotics.   She is currently on Augmentin and may notice some improvement in her bowel movements on this therapy if her symptoms are associated with small intestinal bacterial overgrowth.   Recommend low FODMAP diet and trial of Ibgard  Likely related to IBS as well - consider trial of Xifaxan     Follow Up   Return in about 3 months (around 9/2/2025) for Kerry Marin PA-C.    Dragon dictation used throughout this note.            Kerry Marin PA-C   [facility]  [address] [place] [address]  Office: [phone]

## 2025-06-02 NOTE — TELEPHONE ENCOUNTER
----- Message from Kerry Marin sent at 6/2/2025 10:09 AM EDT -----  Can we please ship her the SIBO testing.

## 2025-06-06 NOTE — PROGRESS NOTES
June 2, 2025    Hello, may I speak with Giovanna Lao?    My name is Silvia      I am  with [facility]  [address]  [place] [address] [phone].    Before we get started may I verify your date of birth? 1990    I am calling to officially welcome you to our practice and ask about your recent visit. Is this a good time to talk? yes    Tell me about your visit with us. What things went well?  I appreciated that the provider sat down and went over everything with me and didn't rush. That is one thing that does bother me when I go to appointments is when a provider is seeing me and basically has one had on the door and ready to run out and that was not the case with this appointment so I appreciated that. She listened and really seems like she wants to help solve my issues. My initial appointment was a little far out but I would call like maybe once a week to see if I could get in sooner and I was able to do so.        We're always looking for ways to make our patients' experiences even better. Do you have recommendations on ways we may improve?  no    Overall were you satisfied with your first visit to our practice? yes       I appreciate you taking the time to speak with me today. Is there anything else I can do for you? no      Thank you, and have a great day.

## 2025-06-15 DIAGNOSIS — G89.29 CHRONIC NECK PAIN: ICD-10-CM

## 2025-06-15 DIAGNOSIS — M54.2 CHRONIC NECK PAIN: ICD-10-CM

## 2025-06-15 DIAGNOSIS — M54.50 CHRONIC BILATERAL LOW BACK PAIN WITHOUT SCIATICA: ICD-10-CM

## 2025-06-15 DIAGNOSIS — F41.0 GENERALIZED ANXIETY DISORDER WITH PANIC ATTACKS: ICD-10-CM

## 2025-06-15 DIAGNOSIS — M54.6 CHRONIC MIDLINE THORACIC BACK PAIN: ICD-10-CM

## 2025-06-15 DIAGNOSIS — G89.29 CHRONIC BILATERAL LOW BACK PAIN WITHOUT SCIATICA: ICD-10-CM

## 2025-06-15 DIAGNOSIS — F32.A DEPRESSION, UNSPECIFIED DEPRESSION TYPE: ICD-10-CM

## 2025-06-15 DIAGNOSIS — F41.1 GENERALIZED ANXIETY DISORDER WITH PANIC ATTACKS: ICD-10-CM

## 2025-06-15 DIAGNOSIS — G89.29 CHRONIC MIDLINE THORACIC BACK PAIN: ICD-10-CM

## 2025-06-16 RX ORDER — CELECOXIB 100 MG/1
100 CAPSULE ORAL 2 TIMES DAILY PRN
Qty: 60 CAPSULE | Refills: 3 | Status: SHIPPED | OUTPATIENT
Start: 2025-06-16

## 2025-06-16 RX ORDER — ESCITALOPRAM OXALATE 20 MG/1
20 TABLET ORAL EVERY EVENING
Qty: 90 TABLET | Refills: 0 | Status: SHIPPED | OUTPATIENT
Start: 2025-06-16

## 2025-06-25 ENCOUNTER — PATIENT MESSAGE (OUTPATIENT)
Dept: PAIN MEDICINE | Facility: CLINIC | Age: 35
End: 2025-06-25
Payer: OTHER GOVERNMENT

## 2025-06-25 DIAGNOSIS — M62.838 MUSCLE SPASM: ICD-10-CM

## 2025-06-25 DIAGNOSIS — M60.9 MYOFASCIITIS: Primary | ICD-10-CM

## 2025-07-02 PROBLEM — M62.838 MUSCLE SPASM: Status: ACTIVE | Noted: 2025-06-25

## 2025-07-08 NOTE — PROGRESS NOTES
The patient has a pain history of the following:  Chronic low back pain  History of thoracic outlet syndrome  Lumbar spondylosis  Myofasciitis  Muscle spasm     Previous interventions that the patient has received include:   None      Pain medications include:  Celebrex  Lidocaine patch  Methocarbamol      Previously: Flexeril (sleepiness)     Other conservative modalities which the patient reports using include:  Physical Therapy: yes  Chiropractor: no  Massage Therapy: no  TENS: yes  Neck or back surgery: yes  Past pain management: no  Ice  Heat  Dry needling      Past Significant Surgical History:  Rib resection 2012     HPI:     CHIEF COMPLAINT Back Pain  Back pain  Pain fluctuates wants to discuss TPIs.    Subjective   Giovanna Lao is a 34 y.o. female  who presents for follow-up.  She has a history of chronic axial low back pain that worsens when she stands for long periods of time or when she is lying in bed at night.  Her symptoms are episodic.  At her previous visit I changed her muscle relaxant to methocarbamol and offered trigger point injections and discussed lumbar medial branch blocks and radiofrequency ablation if her pain became more constant.    History of Present Illness  She continues doing physical therapy 1-2 times per week, she's using a stand to sit desk and her posture has improved.  Her low back pain is doing okay and when she's having bad days she takes celecoxib and that helps.  The bilateral trapezius muscles are still very tight and she is interested in trying the trigger point injections.         PEG Assessment   What number best describes your pain on average in the past week?3  What number best describes how, during the past week, pain has interfered with your enjoyment of life?0  What number best describes how, during the past week, pain has interfered with your general activity?  1    REVIEW OF PERTINENT MEDICAL DATA            The following portions of the patient's history  "were reviewed and updated as appropriate: allergies, current medications, past family history, past medical history, past social history, past surgical history, and problem list.    Review of Systems   Constitutional:  Negative for activity change and fever.   Gastrointestinal:  Negative for constipation and diarrhea.   Genitourinary:  Negative for difficulty urinating.   Musculoskeletal:  Positive for back pain.   Neurological:  Positive for headaches. Negative for dizziness, weakness and numbness.   Psychiatric/Behavioral:  Positive for sleep disturbance. Negative for agitation and suicidal ideas. The patient is not nervous/anxious.      I have reviewed and confirmed the accuracy of the ROS as documented by the MA/LPN/RN Mary Alice Suarez MD    Vitals:    07/10/25 0941   BP: 106/71   BP Location: Left arm   Patient Position: Sitting   Cuff Size: Adult   Pulse: 76   Resp: 16   Temp: 97.3 °F (36.3 °C)   TempSrc: Temporal   SpO2: 98%   Weight: 75.8 kg (167 lb)   Height: 175.3 cm (69\")   PainSc: 2          Objective   Physical Exam  Constitutional:       General: She is not in acute distress.  Pulmonary:      Effort: Pulmonary effort is normal. No respiratory distress.   Musculoskeletal:      Comments: Tense bands of muscle tissue present on bilateral trapezius regions, rhomboid and levator, and cervical paraspinous muscles.  Tenderness to palpation in these areas.    Skin:     General: Skin is warm and dry.   Neurological:      Mental Status: She is alert.   Psychiatric:         Mood and Affect: Mood normal.         Thought Content: Thought content normal.           Assessment & Plan   Diagnoses and all orders for this visit:    1. Myofasciitis (Primary)    2. Muscle spasm          - Will schedule for bilateral trapezius, rhomboid, levator, cervical paraspinous region Trigger point injection(s).  Risks discussed including but not limited to bleeding, bruising, infection, damage to surrounding structures, headache, and " rare things such as being paralyzed, seizure, stroke, heart attack and death.  The risk of steroid medications include but are not limited to immunosuppression, which can increase the risk of thong an infectious disease as well as decrease the immune response to a vaccine.  - She is hesitant to have any injections in her low back due to bad experience during child birth.     - Giovanna Lao reports a pain score of 2.  Given her pain assessment as noted, treatment options were discussed and the following options were decided upon as a follow-up plan to address the patient's pain: steroid injections.  - Patient screened positive for depression based on a PHQ-9 score of  on . Follow-up recommendations include: no score populated.  - Giovanna Lao  reports that she has never smoked. She has never used smokeless tobacco.       --- Follow-up for bilateral trapezius, rhomboid, levator, cervical paraspinous region Trigger point injection(s).            Mary Alice Suarez MD  Pain Management    EMR Dragon/Transcription disclaimer:   Part of this note may be an electronic transcription/translation of spoken language to printed text using the Dragon Dictation System.

## 2025-07-09 ENCOUNTER — DOCUMENTATION (OUTPATIENT)
Dept: PAIN MEDICINE | Facility: CLINIC | Age: 35
End: 2025-07-09
Payer: OTHER GOVERNMENT

## 2025-07-09 NOTE — PROGRESS NOTES
Trigger point injections planned for bilateral trapezius, levator and rhomboid regions as well as lumbar paraspinous musculature.  Medication to be used includes bupivacaine 0.25% and methylprednisolone 40mg.  She has failed 12 weeks of conservative therapies including physical therapy, TENS unit, ice, heat, dry needling, NSAID, muscle relaxant and lidocaine patches.

## 2025-07-10 ENCOUNTER — OFFICE VISIT (OUTPATIENT)
Dept: PAIN MEDICINE | Facility: CLINIC | Age: 35
End: 2025-07-10
Payer: OTHER GOVERNMENT

## 2025-07-10 VITALS
DIASTOLIC BLOOD PRESSURE: 71 MMHG | WEIGHT: 167 LBS | HEIGHT: 69 IN | HEART RATE: 76 BPM | SYSTOLIC BLOOD PRESSURE: 106 MMHG | RESPIRATION RATE: 16 BRPM | BODY MASS INDEX: 24.73 KG/M2 | OXYGEN SATURATION: 98 % | TEMPERATURE: 97.3 F

## 2025-07-10 DIAGNOSIS — M62.838 MUSCLE SPASM: ICD-10-CM

## 2025-07-10 DIAGNOSIS — M60.9 MYOFASCIITIS: Primary | ICD-10-CM

## 2025-07-10 PROCEDURE — 99214 OFFICE O/P EST MOD 30 MIN: CPT | Performed by: ANESTHESIOLOGY

## 2025-07-16 ENCOUNTER — TELEPHONE (OUTPATIENT)
Dept: PAIN MEDICINE | Facility: CLINIC | Age: 35
End: 2025-07-16

## 2025-07-16 NOTE — PROGRESS NOTES
"CHIEF COMPLAINT: Neck Pain      Giovanna Lao is a 34 y.o. female.  She is here for the following procedure:  trigger point injections,  bilateral trapezius, rhomboid, levator, cervical paraspinous region .     Vitals:    07/17/25 0757   BP: 96/59   BP Location: Left arm   Patient Position: Sitting   Cuff Size: Adult   Pulse: 67   Resp: 16   Temp: 97.1 °F (36.2 °C)   TempSrc: Temporal   SpO2: 99%   Height: 175.3 cm (69\")   PainSc: 3        Bupivacaine 0.25% Lot#: [id number] Exp:   NDC: 31689-553-35  Depo Medrol 40 mg Lot#: [id number] Exp:   NDC: 7515-3142-15     TRIGGER POINT INJECTION on 7/17/2025 9:34 AM  Indications: pain, muscle spasm and myalgia    The risks and benefits of the procedure have been discussed with the patient and they have elected to proceed.  Appropriate consent forms have been signed.  The patient was placed in a comfortable position to approach the trigger point sites.  The appropriate areas were scrubbed and draped in a sterile fashion.  A pre-procedure time-out was performed to ensure that the correct procedure is being performed on the appropriate patient.  The patient's drug allergies were also reviewed at this time.    A 25-gauge 1.25 inch needle was used to administer a volume of 2 mL of injectate at the previously identified trigger point site in a fan-like distribution after multiple negative aspirations. The total volume injected consisted of 40 mg of Methylprednisolone mixed with 10 mL Bupivacaine 0.25%.     The patient tolerated the procedure well and after a brief of observation in our clinic was discharged home in a stable condition with no apparent complications.       Consent was given by the patient. Immediately prior to procedure a time out was called to verify the correct patient, procedure, equipment, support staff and site/side marked as required. Patient was prepped and draped in the usual sterile fashion.         Physical Exam  Neck:             Visit " Diagnoses:  1. Myofasciitis        Mary Alice Suarez MD  Pain Management

## 2025-07-17 ENCOUNTER — PROCEDURE VISIT (OUTPATIENT)
Dept: PAIN MEDICINE | Facility: CLINIC | Age: 35
End: 2025-07-17
Payer: OTHER GOVERNMENT

## 2025-07-17 VITALS
RESPIRATION RATE: 16 BRPM | HEART RATE: 67 BPM | TEMPERATURE: 97.1 F | BODY MASS INDEX: 24.66 KG/M2 | SYSTOLIC BLOOD PRESSURE: 96 MMHG | DIASTOLIC BLOOD PRESSURE: 59 MMHG | OXYGEN SATURATION: 99 % | HEIGHT: 69 IN

## 2025-07-17 DIAGNOSIS — M60.9 MYOFASCIITIS: Primary | ICD-10-CM

## 2025-07-17 RX ORDER — BUPIVACAINE HYDROCHLORIDE 2.5 MG/ML
5 INJECTION, SOLUTION EPIDURAL; INFILTRATION; INTRACAUDAL; PERINEURAL ONCE
Status: COMPLETED | OUTPATIENT
Start: 2025-07-17 | End: 2025-07-17

## 2025-07-17 RX ORDER — METHYLPREDNISOLONE ACETATE 40 MG/ML
40 INJECTION, SUSPENSION INTRA-ARTICULAR; INTRALESIONAL; INTRAMUSCULAR; SOFT TISSUE ONCE
Status: COMPLETED | OUTPATIENT
Start: 2025-07-17 | End: 2025-07-17

## 2025-07-17 RX ADMIN — METHYLPREDNISOLONE ACETATE 40 MG: 40 INJECTION, SUSPENSION INTRA-ARTICULAR; INTRALESIONAL; INTRAMUSCULAR; SOFT TISSUE at 08:10

## 2025-07-17 RX ADMIN — BUPIVACAINE HYDROCHLORIDE 5 ML: 2.5 INJECTION, SOLUTION EPIDURAL; INFILTRATION; INTRACAUDAL; PERINEURAL at 08:10

## 2025-07-25 ENCOUNTER — OFFICE VISIT (OUTPATIENT)
Dept: INTERNAL MEDICINE | Facility: CLINIC | Age: 35
End: 2025-07-25
Payer: OTHER GOVERNMENT

## 2025-07-25 ENCOUNTER — LAB (OUTPATIENT)
Dept: LAB | Facility: HOSPITAL | Age: 35
End: 2025-07-25
Payer: OTHER GOVERNMENT

## 2025-07-25 VITALS
RESPIRATION RATE: 17 BRPM | HEIGHT: 69 IN | SYSTOLIC BLOOD PRESSURE: 106 MMHG | WEIGHT: 165 LBS | HEART RATE: 70 BPM | OXYGEN SATURATION: 99 % | DIASTOLIC BLOOD PRESSURE: 70 MMHG | TEMPERATURE: 98 F | BODY MASS INDEX: 24.44 KG/M2

## 2025-07-25 DIAGNOSIS — M54.2 CHRONIC NECK PAIN: Primary | ICD-10-CM

## 2025-07-25 DIAGNOSIS — M54.50 CHRONIC BILATERAL LOW BACK PAIN WITHOUT SCIATICA: ICD-10-CM

## 2025-07-25 DIAGNOSIS — M60.9 MYOFASCIITIS: ICD-10-CM

## 2025-07-25 DIAGNOSIS — R14.0 ABDOMINAL BLOATING: ICD-10-CM

## 2025-07-25 DIAGNOSIS — G89.29 CHRONIC NECK PAIN: Primary | ICD-10-CM

## 2025-07-25 DIAGNOSIS — G89.29 CHRONIC BILATERAL LOW BACK PAIN WITHOUT SCIATICA: ICD-10-CM

## 2025-07-25 DIAGNOSIS — N62 LARGE BREASTS: ICD-10-CM

## 2025-07-25 PROCEDURE — 99213 OFFICE O/P EST LOW 20 MIN: CPT | Performed by: NURSE PRACTITIONER

## 2025-07-25 PROCEDURE — 83013 H PYLORI (C-13) BREATH: CPT

## 2025-07-25 NOTE — PROGRESS NOTES
Subjective   Giovanna Lao is a 35 y.o. female.     Chief Complaint   Patient presents with    Primary Care Follow-Up     Chronic neck and back pain F/U        History of Present Illness   She is here today for follow-up on chronic neck and low back pain.  She has been following with pain management and recently underwent trigger point injections in the bilateral trapezius, rhomboid, levator, cervical paraspinous region.  Previously underwent MRI of the lumbar spine showing multilevel degenerative changes with a disc protrusion at L2-3 and L5-S1.  She notes low back pain is improving with PT.  She is currently taking celebrex, lidocaine and robaxin.  She still notes pain and tightness in the neck and upper shoulders occasionally radiating towards the middle back. Pain is described as muscular with tightness and aching. She often has a diffiuclt time maintaining good posture, with forward sloping shoulders.  When she tries to correct her posture she notes worsening muscular pain.  She is currently a 35 DDD.  She typically will wear a supportive sports bra but notes frequent indentations in her shoulders.  She does feel that her breast size may be contributing to symptoms. She has not seen relief in neck and upper back pain with PT, cupping, dry needling or trigger point injections.   She denies any radicular symptoms, paresthesias, upper extremity weakness.    The following portions of the patient's history were reviewed and updated as appropriate: allergies, current medications, past family history, past medical history, past social history, past surgical history, and problem list.    Review of Systems   Constitutional: Negative.    Respiratory: Negative.     Cardiovascular: Negative.    Musculoskeletal:  Positive for back pain and neck pain.   Neurological:  Negative for weakness and numbness.   Psychiatric/Behavioral: Negative.         Objective   Physical Exam  Constitutional:       Appearance: She is  well-developed.   Neck:      Thyroid: No thyroid mass, thyromegaly or thyroid tenderness.      Vascular: No carotid bruit.      Trachea: Trachea normal.   Cardiovascular:      Rate and Rhythm: Normal rate and regular rhythm.      Chest Wall: PMI is not displaced.      Pulses:           Radial pulses are 2+ on the right side and 2+ on the left side.        Dorsalis pedis pulses are 2+ on the right side and 2+ on the left side.        Posterior tibial pulses are 2+ on the right side and 2+ on the left side.      Heart sounds: S1 normal and S2 normal.   Pulmonary:      Effort: Pulmonary effort is normal.      Breath sounds: Normal breath sounds.   Musculoskeletal:      Cervical back: Spasms and tenderness present. No swelling, edema, deformity, erythema, signs of trauma, lacerations, rigidity, torticollis, bony tenderness or crepitus. Pain with movement present. Decreased range of motion.      Thoracic back: Tenderness present.      Right lower leg: No edema.      Left lower leg: No edema.      Comments: Forward sloping shoulders with difficulty maintaining good posture. Neck projecting forward with seated posture.    Lymphadenopathy:      Head:      Right side of head: No submental, submandibular, tonsillar or occipital adenopathy.      Left side of head: No submental, submandibular, tonsillar or occipital adenopathy.      Cervical: No cervical adenopathy.   Skin:     General: Skin is warm and dry.      Capillary Refill: Capillary refill takes less than 2 seconds.      Nails: There is no clubbing.   Neurological:      Mental Status: She is alert and oriented to person, place, and time.   Psychiatric:         Attention and Perception: Attention normal.         Mood and Affect: Mood and affect normal.         Speech: Speech normal.         Behavior: Behavior normal.         Thought Content: Thought content normal.         Cognition and Memory: Cognition normal.         Vitals:    07/25/25 0913   BP: 106/70   Pulse: 70    Resp: 17   Temp: 98 °F (36.7 °C)   SpO2: 99%      Body mass index is 24.35 kg/m².    Assessment & Plan   Problems Addressed this Visit       Chronic bilateral low back pain    Myofasciitis    Relevant Orders    Ambulatory Referral to Plastic Surgery (Completed)     Other Visit Diagnoses         Chronic neck pain    -  Primary    Relevant Orders    Ambulatory Referral to Plastic Surgery (Completed)      Large breasts        Relevant Orders    Ambulatory Referral to Plastic Surgery (Completed)          Diagnoses         Codes Comments      Chronic neck pain    -  Primary ICD-10-CM: M54.2, G89.29  ICD-9-CM: 723.1, 338.29       Myofasciitis     ICD-10-CM: M60.9  ICD-9-CM: 729.1       Large breasts     ICD-10-CM: N62  ICD-9-CM: 611.1       Chronic bilateral low back pain without sciatica     ICD-10-CM: M54.50, G89.29  ICD-9-CM: 724.2, 338.29           1.  Chronic neck pain/mild fasciitis/large breasts-I do feel that breast size may be contributing to chronic neck and upper back pain.  Will place referral to plastic surgery to evaluate for possible reduction.  Okay to continue physical therapy, Celebrex, lidocaine and Robaxin.  Recommend working on posture and stretching.  2.  Chronic bilateral low back pain-improving with conservative treatment.  Recommend continuing physical therapy.

## 2025-07-26 LAB — UREA BREATH TEST QL: NEGATIVE

## 2025-07-28 ENCOUNTER — TELEPHONE (OUTPATIENT)
Dept: PAIN MEDICINE | Facility: CLINIC | Age: 35
End: 2025-07-28
Payer: OTHER GOVERNMENT

## 2025-07-28 NOTE — TELEPHONE ENCOUNTER
"Patient called today stating she woke up yesterday with stiffness/pain in the area of her of TPI's (performed 7/17/25) and could barely turn her head. She states things \"are a little better\" today. She did take a muscle relaxant as well as anti-inflammatory without improvement. Please advise  "

## 2025-07-28 NOTE — TELEPHONE ENCOUNTER
Please make sure no injection site issues.  Rotate ice and heat.  Continue taking her usual medications for pain and performing gentle stretches/range of motion.

## 2025-07-28 NOTE — TELEPHONE ENCOUNTER
Patient states she has been having slightly worse headaches over the past few days (she has had headaches in the past as well), denies fever,chills, flu like symptoms, increased redness/tenderness at injection sites, loss of bowel/bladder control. I did give patient your recommendations.

## 2025-08-05 ENCOUNTER — OFFICE VISIT (OUTPATIENT)
Dept: PLASTIC SURGERY | Facility: CLINIC | Age: 35
End: 2025-08-05
Payer: OTHER GOVERNMENT

## 2025-08-05 VITALS
HEIGHT: 69 IN | DIASTOLIC BLOOD PRESSURE: 67 MMHG | HEART RATE: 68 BPM | BODY MASS INDEX: 24.68 KG/M2 | SYSTOLIC BLOOD PRESSURE: 112 MMHG | WEIGHT: 166.6 LBS | OXYGEN SATURATION: 97 %

## 2025-08-05 DIAGNOSIS — M54.2 CERVICALGIA: ICD-10-CM

## 2025-08-05 DIAGNOSIS — M25.512 CHRONIC PAIN OF BOTH SHOULDERS: ICD-10-CM

## 2025-08-05 DIAGNOSIS — R21 RASH: ICD-10-CM

## 2025-08-05 DIAGNOSIS — Z86.69: ICD-10-CM

## 2025-08-05 DIAGNOSIS — M25.511 CHRONIC PAIN OF BOTH SHOULDERS: ICD-10-CM

## 2025-08-05 DIAGNOSIS — N62 MACROMASTIA: Primary | ICD-10-CM

## 2025-08-05 DIAGNOSIS — G89.29 CHRONIC PAIN OF BOTH SHOULDERS: ICD-10-CM

## 2025-08-05 DIAGNOSIS — Z91.09 ENVIRONMENTAL ALLERGIES: ICD-10-CM

## 2025-08-05 PROCEDURE — 99205 OFFICE O/P NEW HI 60 MIN: CPT | Performed by: SURGERY

## 2025-08-05 RX ORDER — ACETAMINOPHEN 325 MG/1
1000 TABLET ORAL
OUTPATIENT
Start: 2025-08-06 | End: 2025-08-07

## 2025-08-05 RX ORDER — SODIUM CHLORIDE, SODIUM LACTATE, POTASSIUM CHLORIDE, CALCIUM CHLORIDE 600; 310; 30; 20 MG/100ML; MG/100ML; MG/100ML; MG/100ML
100 INJECTION, SOLUTION INTRAVENOUS CONTINUOUS
OUTPATIENT
Start: 2025-08-05 | End: 2025-08-05

## 2025-08-05 RX ORDER — SODIUM CHLORIDE 9 MG/ML
40 INJECTION, SOLUTION INTRAVENOUS AS NEEDED
OUTPATIENT
Start: 2025-08-05

## 2025-08-05 RX ORDER — SCOPOLAMINE 1 MG/3D
1 PATCH, EXTENDED RELEASE TRANSDERMAL CONTINUOUS
OUTPATIENT
Start: 2025-08-05 | End: 2025-08-08

## 2025-08-05 RX ORDER — SODIUM CHLORIDE 0.9 % (FLUSH) 0.9 %
10 SYRINGE (ML) INJECTION AS NEEDED
OUTPATIENT
Start: 2025-08-05

## 2025-08-05 RX ORDER — DEXAMETHASONE SODIUM PHOSPHATE 4 MG/ML
4 INJECTION, SOLUTION INTRA-ARTICULAR; INTRALESIONAL; INTRAMUSCULAR; INTRAVENOUS; SOFT TISSUE
OUTPATIENT
Start: 2025-08-06 | End: 2025-08-07

## 2025-08-05 RX ORDER — SODIUM CHLORIDE 0.9 % (FLUSH) 0.9 %
10 SYRINGE (ML) INJECTION EVERY 12 HOURS SCHEDULED
OUTPATIENT
Start: 2025-08-05

## 2025-08-07 ENCOUNTER — TELEPHONE (OUTPATIENT)
Dept: PLASTIC SURGERY | Facility: CLINIC | Age: 35
End: 2025-08-07
Payer: OTHER GOVERNMENT

## 2025-08-14 ENCOUNTER — HOSPITAL ENCOUNTER (OUTPATIENT)
Dept: CARDIOLOGY | Facility: HOSPITAL | Age: 35
Discharge: HOME | End: 2025-08-14
Payer: OTHER GOVERNMENT

## 2025-08-14 ENCOUNTER — HOSPITAL ENCOUNTER (OUTPATIENT)
Dept: GENERAL RADIOLOGY | Facility: HOSPITAL | Age: 35
Discharge: HOME | End: 2025-08-14
Payer: OTHER GOVERNMENT

## 2025-08-14 ENCOUNTER — LAB (OUTPATIENT)
Dept: LAB | Facility: HOSPITAL | Age: 35
End: 2025-08-14
Payer: OTHER GOVERNMENT

## 2025-08-14 DIAGNOSIS — Z91.09 ENVIRONMENTAL ALLERGIES: ICD-10-CM

## 2025-08-14 DIAGNOSIS — M25.511 CHRONIC PAIN OF BOTH SHOULDERS: ICD-10-CM

## 2025-08-14 DIAGNOSIS — M54.2 CERVICALGIA: ICD-10-CM

## 2025-08-14 DIAGNOSIS — M25.512 CHRONIC PAIN OF BOTH SHOULDERS: ICD-10-CM

## 2025-08-14 DIAGNOSIS — N62 MACROMASTIA: ICD-10-CM

## 2025-08-14 DIAGNOSIS — G89.29 CHRONIC PAIN OF BOTH SHOULDERS: ICD-10-CM

## 2025-08-14 DIAGNOSIS — R21 RASH: ICD-10-CM

## 2025-08-14 DIAGNOSIS — Z86.69: ICD-10-CM

## 2025-08-14 LAB
ALBUMIN SERPL-MCNC: 4.2 G/DL (ref 3.5–5.2)
ALBUMIN/GLOB SERPL: 1.4 G/DL
ALP SERPL-CCNC: 39 U/L (ref 39–117)
ALT SERPL W P-5'-P-CCNC: 20 U/L (ref 1–33)
ANION GAP SERPL CALCULATED.3IONS-SCNC: 10.9 MMOL/L (ref 5–15)
APTT PPP: 28.6 SECONDS (ref 22.7–35.4)
AST SERPL-CCNC: 22 U/L (ref 1–32)
BASOPHILS # BLD AUTO: 0.05 10*3/MM3 (ref 0–0.2)
BASOPHILS NFR BLD AUTO: 0.6 % (ref 0–1.5)
BILIRUB SERPL-MCNC: 0.4 MG/DL (ref 0–1.2)
BUN SERPL-MCNC: 12 MG/DL (ref 6–20)
BUN/CREAT SERPL: 14.6 (ref 7–25)
CALCIUM SPEC-SCNC: 9 MG/DL (ref 8.6–10.5)
CHLORIDE SERPL-SCNC: 104 MMOL/L (ref 98–107)
CO2 SERPL-SCNC: 23.1 MMOL/L (ref 22–29)
CREAT SERPL-MCNC: 0.82 MG/DL (ref 0.57–1)
DEPRECATED RDW RBC AUTO: 43.3 FL (ref 37–54)
EGFRCR SERPLBLD CKD-EPI 2021: 96.4 ML/MIN/1.73
EOSINOPHIL # BLD AUTO: 0.28 10*3/MM3 (ref 0–0.4)
EOSINOPHIL NFR BLD AUTO: 3.5 % (ref 0.3–6.2)
ERYTHROCYTE [DISTWIDTH] IN BLOOD BY AUTOMATED COUNT: 12.4 % (ref 12.3–15.4)
GLOBULIN UR ELPH-MCNC: 3 GM/DL
GLUCOSE SERPL-MCNC: 81 MG/DL (ref 65–99)
HBA1C MFR BLD: 5.1 % (ref 4.8–5.6)
HCT VFR BLD AUTO: 39.7 % (ref 34–46.6)
HGB BLD-MCNC: 12.9 G/DL (ref 12–15.9)
IMM GRANULOCYTES # BLD AUTO: 0.01 10*3/MM3 (ref 0–0.05)
IMM GRANULOCYTES NFR BLD AUTO: 0.1 % (ref 0–0.5)
INR PPP: 1.18 (ref 0.9–1.1)
LYMPHOCYTES # BLD AUTO: 3.99 10*3/MM3 (ref 0.7–3.1)
LYMPHOCYTES NFR BLD AUTO: 49.8 % (ref 19.6–45.3)
MCH RBC QN AUTO: 30.9 PG (ref 26.6–33)
MCHC RBC AUTO-ENTMCNC: 32.5 G/DL (ref 31.5–35.7)
MCV RBC AUTO: 95.2 FL (ref 79–97)
MONOCYTES # BLD AUTO: 0.48 10*3/MM3 (ref 0.1–0.9)
MONOCYTES NFR BLD AUTO: 6 % (ref 5–12)
NEUTROPHILS NFR BLD AUTO: 3.2 10*3/MM3 (ref 1.7–7)
NEUTROPHILS NFR BLD AUTO: 40 % (ref 42.7–76)
NRBC BLD AUTO-RTO: 0 /100 WBC (ref 0–0.2)
PLATELET # BLD AUTO: 301 10*3/MM3 (ref 140–450)
PMV BLD AUTO: 8.7 FL (ref 6–12)
POTASSIUM SERPL-SCNC: 3.7 MMOL/L (ref 3.5–5.2)
PROT SERPL-MCNC: 7.2 G/DL (ref 6–8.5)
PROTHROMBIN TIME: 15 SECONDS (ref 11.7–14.2)
QT INTERVAL: 412 MS
QTC INTERVAL: 413 MS
RBC # BLD AUTO: 4.17 10*6/MM3 (ref 3.77–5.28)
SODIUM SERPL-SCNC: 138 MMOL/L (ref 136–145)
WBC NRBC COR # BLD AUTO: 8.01 10*3/MM3 (ref 3.4–10.8)

## 2025-08-14 PROCEDURE — 80305 DRUG TEST PRSMV DIR OPT OBS: CPT

## 2025-08-14 PROCEDURE — 80053 COMPREHEN METABOLIC PANEL: CPT

## 2025-08-14 PROCEDURE — 71046 X-RAY EXAM CHEST 2 VIEWS: CPT

## 2025-08-14 PROCEDURE — 36415 COLL VENOUS BLD VENIPUNCTURE: CPT

## 2025-08-14 PROCEDURE — 93005 ELECTROCARDIOGRAM TRACING: CPT | Performed by: SURGERY

## 2025-08-14 PROCEDURE — 85610 PROTHROMBIN TIME: CPT

## 2025-08-14 PROCEDURE — 85025 COMPLETE CBC W/AUTO DIFF WBC: CPT

## 2025-08-14 PROCEDURE — 85730 THROMBOPLASTIN TIME PARTIAL: CPT

## 2025-08-14 PROCEDURE — 83036 HEMOGLOBIN GLYCOSYLATED A1C: CPT

## 2025-08-15 LAB
COTININE UR QL SCN: NEGATIVE NG/ML
SERVICE CMNT-IMP: NORMAL

## 2025-08-18 DIAGNOSIS — Z00.00 HEALTHCARE MAINTENANCE: Primary | ICD-10-CM

## 2025-08-19 ENCOUNTER — LAB (OUTPATIENT)
Facility: HOSPITAL | Age: 35
End: 2025-08-19
Payer: OTHER GOVERNMENT

## 2025-08-19 ENCOUNTER — OFFICE VISIT (OUTPATIENT)
Dept: PLASTIC SURGERY | Facility: CLINIC | Age: 35
End: 2025-08-19
Payer: OTHER GOVERNMENT

## 2025-08-19 VITALS
HEIGHT: 69 IN | SYSTOLIC BLOOD PRESSURE: 126 MMHG | DIASTOLIC BLOOD PRESSURE: 81 MMHG | OXYGEN SATURATION: 98 % | BODY MASS INDEX: 23.99 KG/M2 | WEIGHT: 162 LBS | HEART RATE: 66 BPM

## 2025-08-19 DIAGNOSIS — N62 MACROMASTIA: Primary | ICD-10-CM

## 2025-08-19 DIAGNOSIS — Z13.0 SCREENING, IRON DEFICIENCY ANEMIA: ICD-10-CM

## 2025-08-19 DIAGNOSIS — Z86.69: ICD-10-CM

## 2025-08-19 DIAGNOSIS — Z13.220 SCREENING CHOLESTEROL LEVEL: ICD-10-CM

## 2025-08-19 DIAGNOSIS — M25.512 CHRONIC PAIN OF BOTH SHOULDERS: ICD-10-CM

## 2025-08-19 DIAGNOSIS — R21 RASH: ICD-10-CM

## 2025-08-19 DIAGNOSIS — Z91.09 ENVIRONMENTAL ALLERGIES: ICD-10-CM

## 2025-08-19 DIAGNOSIS — Z00.00 HEALTHCARE MAINTENANCE: Primary | ICD-10-CM

## 2025-08-19 DIAGNOSIS — Z13.29 SCREENING FOR THYROID DISORDER: ICD-10-CM

## 2025-08-19 DIAGNOSIS — M54.2 CERVICALGIA: ICD-10-CM

## 2025-08-19 DIAGNOSIS — M25.511 CHRONIC PAIN OF BOTH SHOULDERS: ICD-10-CM

## 2025-08-19 DIAGNOSIS — G89.29 CHRONIC PAIN OF BOTH SHOULDERS: ICD-10-CM

## 2025-08-19 PROCEDURE — 83540 ASSAY OF IRON: CPT | Performed by: NURSE PRACTITIONER

## 2025-08-19 PROCEDURE — 84443 ASSAY THYROID STIM HORMONE: CPT | Performed by: NURSE PRACTITIONER

## 2025-08-19 PROCEDURE — 82607 VITAMIN B-12: CPT | Performed by: NURSE PRACTITIONER

## 2025-08-19 PROCEDURE — 99214 OFFICE O/P EST MOD 30 MIN: CPT | Performed by: SURGERY

## 2025-08-19 PROCEDURE — 80061 LIPID PANEL: CPT | Performed by: NURSE PRACTITIONER

## 2025-08-19 PROCEDURE — 83550 IRON BINDING TEST: CPT | Performed by: NURSE PRACTITIONER

## 2025-08-19 PROCEDURE — 36415 COLL VENOUS BLD VENIPUNCTURE: CPT | Performed by: NURSE PRACTITIONER

## 2025-08-19 RX ORDER — OXYCODONE HYDROCHLORIDE 5 MG/1
5 TABLET ORAL EVERY 4 HOURS PRN
Qty: 15 TABLET | Refills: 0 | Status: SHIPPED | OUTPATIENT
Start: 2025-08-19

## 2025-08-19 RX ORDER — ONDANSETRON 4 MG/1
4 TABLET, FILM COATED ORAL DAILY PRN
Qty: 15 TABLET | Refills: 1 | Status: SHIPPED | OUTPATIENT
Start: 2025-08-19

## 2025-08-19 RX ORDER — POLYETHYLENE GLYCOL 3350 17 G/17G
17 POWDER, FOR SOLUTION ORAL DAILY
Qty: 15 PACKET | Refills: 0 | Status: SHIPPED | OUTPATIENT
Start: 2025-08-19

## 2025-08-20 LAB
CHOLEST SERPL-MCNC: 164 MG/DL (ref 100–199)
HDLC SERPL-MCNC: 49 MG/DL
IRON SATN MFR SERPL: 36 % (ref 15–55)
IRON SERPL-MCNC: 157 UG/DL (ref 27–159)
LDLC SERPL CALC-MCNC: 101 MG/DL (ref 0–99)
LDLC/HDLC SERPL: 2.1 RATIO (ref 0–3.2)
TIBC SERPL-MCNC: 434 UG/DL (ref 250–450)
TRIGL SERPL-MCNC: 74 MG/DL (ref 0–149)
TSH SERPL DL<=0.005 MIU/L-ACNC: 2.31 UIU/ML (ref 0.45–4.5)
UIBC SERPL-MCNC: 277 UG/DL (ref 131–425)
VLDLC SERPL CALC-MCNC: 14 MG/DL (ref 5–40)

## 2025-08-22 ENCOUNTER — OFFICE VISIT (OUTPATIENT)
Dept: INTERNAL MEDICINE | Facility: CLINIC | Age: 35
End: 2025-08-22
Payer: OTHER GOVERNMENT

## 2025-08-22 VITALS
BODY MASS INDEX: 24.17 KG/M2 | OXYGEN SATURATION: 99 % | SYSTOLIC BLOOD PRESSURE: 100 MMHG | DIASTOLIC BLOOD PRESSURE: 62 MMHG | HEART RATE: 68 BPM | TEMPERATURE: 98.2 F | HEIGHT: 69 IN | WEIGHT: 163.2 LBS

## 2025-08-22 DIAGNOSIS — R53.83 FATIGUE, UNSPECIFIED TYPE: ICD-10-CM

## 2025-08-22 DIAGNOSIS — G47.33 OSA (OBSTRUCTIVE SLEEP APNEA): ICD-10-CM

## 2025-08-22 DIAGNOSIS — G43.109 MIGRAINE WITH AURA AND WITHOUT STATUS MIGRAINOSUS, NOT INTRACTABLE: ICD-10-CM

## 2025-08-22 DIAGNOSIS — F90.2 ATTENTION DEFICIT HYPERACTIVITY DISORDER (ADHD), COMBINED TYPE: ICD-10-CM

## 2025-08-22 DIAGNOSIS — Z00.00 HEALTHCARE MAINTENANCE: Primary | ICD-10-CM

## 2025-08-22 DIAGNOSIS — F41.1 GENERALIZED ANXIETY DISORDER WITH PANIC ATTACKS: ICD-10-CM

## 2025-08-22 DIAGNOSIS — G47.00 INSOMNIA, UNSPECIFIED TYPE: ICD-10-CM

## 2025-08-22 DIAGNOSIS — F32.A DEPRESSION, UNSPECIFIED DEPRESSION TYPE: ICD-10-CM

## 2025-08-22 DIAGNOSIS — N62 MACROMASTIA: ICD-10-CM

## 2025-08-22 DIAGNOSIS — F41.0 GENERALIZED ANXIETY DISORDER WITH PANIC ATTACKS: ICD-10-CM

## 2025-08-22 DIAGNOSIS — J45.20 MILD INTERMITTENT ASTHMA WITHOUT COMPLICATION: ICD-10-CM

## 2025-08-22 PROCEDURE — 99395 PREV VISIT EST AGE 18-39: CPT | Performed by: NURSE PRACTITIONER

## 2025-08-22 RX ORDER — MAGNESIUM GLYCINATE 15 %
1 POWDER (GRAM) MISCELLANEOUS NIGHTLY
COMMUNITY

## 2025-08-23 DIAGNOSIS — N62 MACROMASTIA: Primary | ICD-10-CM

## 2025-08-23 DIAGNOSIS — M25.511 CHRONIC PAIN OF BOTH SHOULDERS: ICD-10-CM

## 2025-08-23 DIAGNOSIS — R21 RASH: ICD-10-CM

## 2025-08-23 DIAGNOSIS — M54.2 CERVICALGIA: ICD-10-CM

## 2025-08-23 DIAGNOSIS — G89.29 CHRONIC PAIN OF BOTH SHOULDERS: ICD-10-CM

## 2025-08-23 DIAGNOSIS — M25.512 CHRONIC PAIN OF BOTH SHOULDERS: ICD-10-CM

## 2025-08-23 DIAGNOSIS — Z86.69: ICD-10-CM

## 2025-08-23 LAB
Lab: NORMAL
SPECIMEN STATUS: NORMAL
VIT B12 SERPL-MCNC: NORMAL PG/ML

## (undated) DEVICE — GLV SURG PREMIERPRO ORTHO LTX PF SZ7 BRN

## (undated) DEVICE — PANTY KNIT MATERN L/XL

## (undated) DEVICE — SPNG LAP 18X18IN LF STRL PK/5

## (undated) DEVICE — KT ORCA ORCAPOD DISP STRL

## (undated) DEVICE — SENSR O2 OXIMAX FNGR A/ 18IN NONSTR

## (undated) DEVICE — SUT GUT CHRM 3/0 SH 27IN G122H

## (undated) DEVICE — ANTIBACTERIAL UNDYED BRAIDED (POLYGLACTIN 910), SYNTHETIC ABSORBABLE SUTURE: Brand: COATED VICRYL

## (undated) DEVICE — SPNG GZ WOVN 4X4IN 12PLY 10/BX STRL

## (undated) DEVICE — LOU MINOR PROCEDURE: Brand: MEDLINE INDUSTRIES, INC.

## (undated) DEVICE — SYR LL TP 10ML STRL

## (undated) DEVICE — PATIENT RETURN ELECTRODE, SINGLE-USE, CONTACT QUALITY MONITORING, ADULT, WITH 9FT CORD, FOR PATIENTS WEIGING OVER 33LBS. (15KG): Brand: MEGADYNE

## (undated) DEVICE — JELLY,LUBE,STERILE,FLIP TOP,TUBE,4-OZ: Brand: MEDLINE

## (undated) DEVICE — CANN O2 ETCO2 FITS ALL CONN CO2 SMPL A/ 7IN DISP LF

## (undated) DEVICE — SINGLE-USE BIOPSY FORCEPS: Brand: RADIAL JAW 4

## (undated) DEVICE — TUBING, SUCTION, 1/4" X 10', STRAIGHT: Brand: MEDLINE

## (undated) DEVICE — HYPODERMIC SAFETY NEEDLE: Brand: MONOJECT

## (undated) DEVICE — ADAPT CLN BIOGUARD AIR/H2O DISP

## (undated) DEVICE — SKIN PREP TRAY 4 COMPARTM TRAY: Brand: MEDLINE INDUSTRIES, INC.